# Patient Record
Sex: FEMALE | Race: WHITE | NOT HISPANIC OR LATINO | Employment: FULL TIME | URBAN - NONMETROPOLITAN AREA
[De-identification: names, ages, dates, MRNs, and addresses within clinical notes are randomized per-mention and may not be internally consistent; named-entity substitution may affect disease eponyms.]

---

## 2017-04-17 ENCOUNTER — HOSPITAL ENCOUNTER (EMERGENCY)
Facility: HOSPITAL | Age: 37
Discharge: HOME | End: 2017-04-17
Attending: EMERGENCY MEDICINE | Admitting: EMERGENCY MEDICINE

## 2017-04-17 ENCOUNTER — APPOINTMENT (OUTPATIENT)
Dept: CT IMAGING | Facility: HOSPITAL | Age: 37
End: 2017-04-17

## 2017-04-17 VITALS
HEIGHT: 65 IN | SYSTOLIC BLOOD PRESSURE: 116 MMHG | TEMPERATURE: 98.8 F | DIASTOLIC BLOOD PRESSURE: 72 MMHG | OXYGEN SATURATION: 100 % | HEART RATE: 66 BPM | BODY MASS INDEX: 28.99 KG/M2 | RESPIRATION RATE: 16 BRPM | WEIGHT: 174 LBS

## 2017-04-17 DIAGNOSIS — G43.009 MIGRAINE WITHOUT AURA AND WITHOUT STATUS MIGRAINOSUS, NOT INTRACTABLE: Primary | ICD-10-CM

## 2017-04-17 LAB
ALBUMIN SERPL-MCNC: 4.5 G/DL (ref 3.5–5)
ALBUMIN/GLOB SERPL: 1.5 G/DL (ref 1.5–2.5)
ALP SERPL-CCNC: 40 U/L (ref 35–104)
ALT SERPL W P-5'-P-CCNC: 13 U/L (ref 10–36)
AMPHET+METHAMPHET UR QL: NEGATIVE
ANION GAP SERPL CALCULATED.3IONS-SCNC: 5.7 MMOL/L (ref 3.6–11.2)
AST SERPL-CCNC: 19 U/L (ref 10–30)
B-HCG UR QL: NEGATIVE
BARBITURATES UR QL SCN: NEGATIVE
BASOPHILS # BLD AUTO: 0.07 10*3/MM3 (ref 0–0.3)
BASOPHILS NFR BLD AUTO: 1.3 % (ref 0–2)
BENZODIAZ UR QL SCN: NEGATIVE
BILIRUB SERPL-MCNC: 0.4 MG/DL (ref 0.2–1.8)
BILIRUB UR QL STRIP: NEGATIVE
BUN BLD-MCNC: 11 MG/DL (ref 7–21)
BUN/CREAT SERPL: 16.4 (ref 7–25)
CALCIUM SPEC-SCNC: 9.2 MG/DL (ref 7.7–10)
CANNABINOIDS SERPL QL: NEGATIVE
CHLORIDE SERPL-SCNC: 106 MMOL/L (ref 99–112)
CLARITY UR: CLEAR
CO2 SERPL-SCNC: 27.3 MMOL/L (ref 24.3–31.9)
COCAINE UR QL: NEGATIVE
COLOR UR: YELLOW
CREAT BLD-MCNC: 0.67 MG/DL (ref 0.43–1.29)
DEPRECATED RDW RBC AUTO: 42.5 FL (ref 37–54)
EOSINOPHIL # BLD AUTO: 0.06 10*3/MM3 (ref 0–0.7)
EOSINOPHIL NFR BLD AUTO: 1.1 % (ref 0–5)
ERYTHROCYTE [DISTWIDTH] IN BLOOD BY AUTOMATED COUNT: 13.3 % (ref 11.5–14.5)
GFR SERPL CREATININE-BSD FRML MDRD: 100 ML/MIN/1.73
GLOBULIN UR ELPH-MCNC: 3.1 GM/DL
GLUCOSE BLD-MCNC: 96 MG/DL (ref 70–110)
GLUCOSE UR STRIP-MCNC: NEGATIVE MG/DL
HCT VFR BLD AUTO: 36.5 % (ref 37–47)
HGB BLD-MCNC: 12.1 G/DL (ref 12–16)
HGB UR QL STRIP.AUTO: NEGATIVE
IMM GRANULOCYTES # BLD: 0 10*3/MM3 (ref 0–0.03)
IMM GRANULOCYTES NFR BLD: 0 % (ref 0–0.5)
KETONES UR QL STRIP: NEGATIVE
LEUKOCYTE ESTERASE UR QL STRIP.AUTO: NEGATIVE
LYMPHOCYTES # BLD AUTO: 1.45 10*3/MM3 (ref 1–3)
LYMPHOCYTES NFR BLD AUTO: 27.6 % (ref 21–51)
MCH RBC QN AUTO: 29 PG (ref 27–33)
MCHC RBC AUTO-ENTMCNC: 33.2 G/DL (ref 33–37)
MCV RBC AUTO: 87.5 FL (ref 80–94)
METHADONE UR QL SCN: NEGATIVE
MONOCYTES # BLD AUTO: 0.6 10*3/MM3 (ref 0.1–0.9)
MONOCYTES NFR BLD AUTO: 11.4 % (ref 0–10)
NEUTROPHILS # BLD AUTO: 3.08 10*3/MM3 (ref 1.4–6.5)
NEUTROPHILS NFR BLD AUTO: 58.6 % (ref 30–70)
NITRITE UR QL STRIP: NEGATIVE
OPIATES UR QL: NEGATIVE
OSMOLALITY SERPL CALC.SUM OF ELEC: 276.8 MOSM/KG (ref 273–305)
OXYCODONE UR QL SCN: NEGATIVE
PCP UR QL SCN: NEGATIVE
PH UR STRIP.AUTO: 7.5 [PH] (ref 5–8)
PLATELET # BLD AUTO: 158 10*3/MM3 (ref 130–400)
PMV BLD AUTO: 11.2 FL (ref 6–10)
POTASSIUM BLD-SCNC: 4 MMOL/L (ref 3.5–5.3)
PROPOXYPH UR QL: NEGATIVE
PROT SERPL-MCNC: 7.6 G/DL (ref 6–8)
PROT UR QL STRIP: NEGATIVE
RBC # BLD AUTO: 4.17 10*6/MM3 (ref 4.2–5.4)
SODIUM BLD-SCNC: 139 MMOL/L (ref 135–153)
SP GR UR STRIP: 1.01 (ref 1–1.03)
UROBILINOGEN UR QL STRIP: NORMAL
WBC NRBC COR # BLD: 5.26 10*3/MM3 (ref 4.5–12.5)

## 2017-04-17 PROCEDURE — 25010000002 PROCHLORPERAZINE EDISYLATE PER 10 MG: Performed by: EMERGENCY MEDICINE

## 2017-04-17 PROCEDURE — 96375 TX/PRO/DX INJ NEW DRUG ADDON: CPT

## 2017-04-17 PROCEDURE — 80307 DRUG TEST PRSMV CHEM ANLYZR: CPT | Performed by: PHYSICIAN ASSISTANT

## 2017-04-17 PROCEDURE — 96361 HYDRATE IV INFUSION ADD-ON: CPT

## 2017-04-17 PROCEDURE — 36415 COLL VENOUS BLD VENIPUNCTURE: CPT

## 2017-04-17 PROCEDURE — 25010000002 DEXAMETHASONE PER 1 MG: Performed by: EMERGENCY MEDICINE

## 2017-04-17 PROCEDURE — 81025 URINE PREGNANCY TEST: CPT | Performed by: PHYSICIAN ASSISTANT

## 2017-04-17 PROCEDURE — 70450 CT HEAD/BRAIN W/O DYE: CPT

## 2017-04-17 PROCEDURE — 81003 URINALYSIS AUTO W/O SCOPE: CPT | Performed by: PHYSICIAN ASSISTANT

## 2017-04-17 PROCEDURE — 25010000002 KETOROLAC TROMETHAMINE PER 15 MG: Performed by: EMERGENCY MEDICINE

## 2017-04-17 PROCEDURE — 99283 EMERGENCY DEPT VISIT LOW MDM: CPT

## 2017-04-17 PROCEDURE — 80053 COMPREHEN METABOLIC PANEL: CPT | Performed by: PHYSICIAN ASSISTANT

## 2017-04-17 PROCEDURE — 96374 THER/PROPH/DIAG INJ IV PUSH: CPT

## 2017-04-17 PROCEDURE — 70450 CT HEAD/BRAIN W/O DYE: CPT | Performed by: RADIOLOGY

## 2017-04-17 PROCEDURE — 25010000002 DIPHENHYDRAMINE PER 50 MG: Performed by: EMERGENCY MEDICINE

## 2017-04-17 PROCEDURE — 85025 COMPLETE CBC W/AUTO DIFF WBC: CPT | Performed by: PHYSICIAN ASSISTANT

## 2017-04-17 RX ORDER — LEVOTHYROXINE SODIUM 112 UG/1
100 CAPSULE ORAL DAILY
COMMUNITY

## 2017-04-17 RX ORDER — CETIRIZINE HYDROCHLORIDE 10 MG/1
10 TABLET ORAL AS NEEDED
COMMUNITY

## 2017-04-17 RX ORDER — KETOROLAC TROMETHAMINE 30 MG/ML
30 INJECTION, SOLUTION INTRAMUSCULAR; INTRAVENOUS ONCE
Status: COMPLETED | OUTPATIENT
Start: 2017-04-17 | End: 2017-04-17

## 2017-04-17 RX ORDER — OMEPRAZOLE 20 MG/1
20 CAPSULE, DELAYED RELEASE ORAL AS NEEDED
COMMUNITY

## 2017-04-17 RX ORDER — MORPHINE SULFATE 2 MG/ML
2 INJECTION, SOLUTION INTRAMUSCULAR; INTRAVENOUS ONCE
Status: DISCONTINUED | OUTPATIENT
Start: 2017-04-17 | End: 2017-04-17

## 2017-04-17 RX ORDER — MONTELUKAST SODIUM 10 MG/1
10 TABLET ORAL AS NEEDED
COMMUNITY

## 2017-04-17 RX ORDER — SODIUM CHLORIDE 0.9 % (FLUSH) 0.9 %
10 SYRINGE (ML) INJECTION AS NEEDED
Status: DISCONTINUED | OUTPATIENT
Start: 2017-04-17 | End: 2017-04-17 | Stop reason: HOSPADM

## 2017-04-17 RX ORDER — DIPHENHYDRAMINE HYDROCHLORIDE 50 MG/ML
25 INJECTION INTRAMUSCULAR; INTRAVENOUS ONCE
Status: COMPLETED | OUTPATIENT
Start: 2017-04-17 | End: 2017-04-17

## 2017-04-17 RX ADMIN — SODIUM CHLORIDE 1000 ML: 9 INJECTION, SOLUTION INTRAVENOUS at 10:39

## 2017-04-17 RX ADMIN — DIPHENHYDRAMINE HYDROCHLORIDE 25 MG: 50 INJECTION INTRAMUSCULAR; INTRAVENOUS at 12:04

## 2017-04-17 RX ADMIN — DEXAMETHASONE SODIUM PHOSPHATE 10 MG: 4 INJECTION, SOLUTION INTRAMUSCULAR; INTRAVENOUS at 12:14

## 2017-04-17 RX ADMIN — KETOROLAC TROMETHAMINE 30 MG: 30 INJECTION, SOLUTION INTRAMUSCULAR; INTRAVENOUS at 12:07

## 2017-04-17 RX ADMIN — PROCHLORPERAZINE EDISYLATE 10 MG: 5 INJECTION INTRAMUSCULAR; INTRAVENOUS at 12:10

## 2017-04-17 NOTE — ED PROVIDER NOTES
Subjective   Patient is a 37 y.o. female presenting with headaches.   History provided by:  Patient   used: No    Headache   Pain location:  Generalized  Quality:  Stabbing  Radiates to:  Does not radiate  Severity at highest:  8/10  Onset quality:  Sudden  Duration:  2 days  Timing:  Intermittent  Progression:  Worsening  Chronicity:  Recurrent  Similar to prior headaches: no    Context: bright light, loud noise and straining    Context: not caffeine and not eating    Relieved by:  Nothing  Worsened by:  Nothing  Associated symptoms: no abdominal pain, no back pain, no blurred vision, no cough, no dizziness, no drainage, no facial pain, no fatigue, no fever, no focal weakness, no neck pain, no neck stiffness, no numbness, no paresthesias, no photophobia, no sore throat, no syncope and no vomiting    Risk factors: no anger and no family hx of SAH        Review of Systems   Constitutional: Negative for fatigue and fever.   HENT: Negative for postnasal drip and sore throat.    Eyes: Negative for blurred vision and photophobia.   Respiratory: Negative for cough.    Cardiovascular: Negative for syncope.   Gastrointestinal: Negative for abdominal pain and vomiting.   Musculoskeletal: Negative for back pain, neck pain and neck stiffness.   Neurological: Positive for headaches. Negative for dizziness, focal weakness, numbness and paresthesias.   All other systems reviewed and are negative.      Past Medical History:   Diagnosis Date   • Asthma    • Cutaneous lupus erythematosus    • Disease of thyroid gland    • Migraine        No Known Allergies    Past Surgical History:   Procedure Laterality Date   • APPENDECTOMY     • HERNIA REPAIR     • TUBAL ABDOMINAL LIGATION         History reviewed. No pertinent family history.    Social History     Social History   • Marital status:      Spouse name: N/A   • Number of children: N/A   • Years of education: N/A     Social History Main Topics   • Smoking  status: Former Smoker     Quit date: 4/17/2014   • Smokeless tobacco: None   • Alcohol use No   • Drug use: No   • Sexual activity: Not Asked     Other Topics Concern   • None     Social History Narrative   • None           Objective   Physical Exam   Constitutional: She is oriented to person, place, and time. She appears well-developed and well-nourished.   HENT:   Head: Normocephalic.   Right Ear: External ear normal.   Left Ear: External ear normal.   Nose: Nose normal.   Mouth/Throat: Oropharynx is clear and moist.   Eyes: Conjunctivae and EOM are normal. Pupils are equal, round, and reactive to light.   Neck: Normal range of motion. Neck supple. No tracheal deviation present. No thyromegaly present.   Cardiovascular: Normal rate, regular rhythm, normal heart sounds and intact distal pulses.    Pulmonary/Chest: Effort normal and breath sounds normal.   Abdominal: Soft. Bowel sounds are normal.   Musculoskeletal: Normal range of motion.   Neurological: She is alert and oriented to person, place, and time. She has normal reflexes.   Skin: Skin is warm and dry.   Psychiatric: She has a normal mood and affect. Her behavior is normal. Judgment and thought content normal.   Nursing note and vitals reviewed.      Procedures         ED Course  ED Course   Comment By Time   37-year-old female comes in with chief complaint migraine headache ×2 days.  Patient does state she's had some vision changes which is common with her migraines.  Patient also reports she's had tongue swelling and difficulty speaking which is atypical for her migraines.  Patient denies any previous history of strokes, hypertension, diabetes, heart disease.  Patient does describe the pain as globally throbbing headache.  Does state the light and sound tend to exacerbate. Maikel Loya PA-C 04/17 1038                  [facility]  Number of Diagnoses or Management Options  Migraine without aura and without status migrainosus, not intractable: new and  requires workup     Amount and/or Complexity of Data Reviewed  Clinical lab tests: ordered and reviewed  Tests in the radiology section of CPT®: ordered and reviewed  Independent visualization of images, tracings, or specimens: yes    Risk of Complications, Morbidity, and/or Mortality  Presenting problems: moderate  Diagnostic procedures: moderate  Management options: moderate    Patient Progress  Patient progress: stable      Final diagnoses:   Migraine without aura and without status migrainosus, not intractable            Maikel Loya PA-C  04/17/17 [id number]

## 2017-06-15 ENCOUNTER — OFFICE VISIT (OUTPATIENT)
Dept: NEUROLOGY | Facility: CLINIC | Age: 37
End: 2017-06-15

## 2017-06-15 VITALS
SYSTOLIC BLOOD PRESSURE: 115 MMHG | OXYGEN SATURATION: 99 % | HEART RATE: 79 BPM | WEIGHT: 160 LBS | HEIGHT: 65 IN | BODY MASS INDEX: 26.66 KG/M2 | DIASTOLIC BLOOD PRESSURE: 72 MMHG

## 2017-06-15 DIAGNOSIS — G43.109 MIGRAINE WITH AURA AND WITHOUT STATUS MIGRAINOSUS, NOT INTRACTABLE: Primary | ICD-10-CM

## 2017-06-15 PROCEDURE — 99244 OFF/OP CNSLTJ NEW/EST MOD 40: CPT | Performed by: PSYCHIATRY & NEUROLOGY

## 2017-06-15 RX ORDER — SUMATRIPTAN 100 MG/1
100 TABLET, FILM COATED ORAL ONCE AS NEEDED
Qty: 9 TABLET | Refills: 5 | Status: SHIPPED | OUTPATIENT
Start: 2017-06-15 | End: 2017-09-21 | Stop reason: SDUPTHER

## 2017-06-15 RX ORDER — TOPIRAMATE 25 MG/1
TABLET ORAL
Refills: 2 | COMMUNITY
Start: 2017-06-01 | End: 2017-06-15 | Stop reason: SDUPTHER

## 2017-06-15 RX ORDER — SUMATRIPTAN 20 MG/1
1 SPRAY NASAL
Qty: 6 EACH | Refills: 6 | Status: SHIPPED | OUTPATIENT
Start: 2017-06-15 | End: 2017-09-21 | Stop reason: SDUPTHER

## 2017-06-15 RX ORDER — TOPIRAMATE 100 MG/1
100 TABLET, FILM COATED ORAL DAILY
Qty: 30 TABLET | Refills: 5 | Status: SHIPPED | OUTPATIENT
Start: 2017-06-15 | End: 2017-09-21 | Stop reason: SINTOL

## 2017-06-15 NOTE — PROGRESS NOTES
Subjective:     Patient ID: Estephanie Nowak is a 37 y.o. female.    History of Present Illness  The following portions of the patient's history were reviewed and updated as appropriate: allergies, current medications, past family history, past medical history, past social history, past surgical history and problem list.  38 y/o WF with history of migraine type headaches for over 5 years, usually right frontal, throbbing, a/w nausea, photophobia, blurred vision, has had an episode of slurred speech and right sided facial and mouth numbness, facial muscles drawing up in the setting of her typical migraine in April, was seen in local ER where her head CT was reportedly negative and symptoms resolved with a migraine cocktail. She denies similar aura symptoms in the past. She has found low dose sumatriptan somewhat helpful, still having headaches several times a week, more around menses, feels that Topamax may be helping some. Denies head or neck trauma, anxiety, depression, family history of aneurism.  Review of Systems   Constitutional: Positive for fatigue. Negative for chills, fever and unexpected weight change.   HENT: Positive for sore throat and voice change. Negative for ear pain, hearing loss, nosebleeds and rhinorrhea.    Eyes: Positive for pain. Negative for photophobia, discharge, itching and visual disturbance.   Respiratory: Positive for cough and wheezing. Negative for chest tightness and shortness of breath.    Cardiovascular: Negative for chest pain, palpitations and leg swelling.   Gastrointestinal: Positive for constipation and diarrhea. Negative for abdominal pain, blood in stool, nausea and vomiting.   Genitourinary: Positive for difficulty urinating. Negative for dysuria, frequency, hematuria and urgency.   Musculoskeletal: Positive for joint swelling. Negative for arthralgias, back pain, gait problem, myalgias, neck pain and neck stiffness.   Skin: Negative for rash and wound.    Allergic/Immunologic: Negative for environmental allergies and food allergies.   Neurological: Negative for dizziness, tremors, seizures, syncope, speech difficulty, weakness, light-headedness, numbness and headaches.   Hematological: Negative for adenopathy. Does not bruise/bleed easily.   Psychiatric/Behavioral: Negative for agitation, confusion, decreased concentration, hallucinations, sleep disturbance and suicidal ideas. The patient is not nervous/anxious.         Objective:    Neurologic Exam     Mental Status   Oriented to person, place, and time.     Cranial Nerves     CN III, IV, VI   Pupils are equal, round, and reactive to light.  Extraocular motions are normal.     Motor Exam     Strength   Strength 5/5 throughout.       Physical Exam   Constitutional: She is oriented to person, place, and time. She appears well-developed and well-nourished.   HENT:   Head: Normocephalic and atraumatic.   Eyes: EOM are normal. Pupils are equal, round, and reactive to light.   Neck: Normal range of motion. Neck supple. Carotid bruit is not present.   Cardiovascular: Normal rate, regular rhythm, normal heart sounds and intact distal pulses.    Pulmonary/Chest: Effort normal and breath sounds normal.   Abdominal: Soft. Bowel sounds are normal.   Musculoskeletal: Normal range of motion.   Neurological: She is alert and oriented to person, place, and time. She has normal strength and normal reflexes. No cranial nerve deficit or sensory deficit. She displays a negative Romberg sign. Coordination and gait normal. She displays no Babinski's sign on the right side. She displays no Babinski's sign on the left side.   Skin: Skin is warm.   Psychiatric: She has a normal mood and affect. Her behavior is normal. Thought content normal. Cognition and memory are normal.       Assessment/Plan:     Estephanie was seen today for migraine.    Diagnoses and all orders for this visit:    Migraine with aura and without status migrainosus, not  intractable  -     MRI Brain Without Contrast  -     topiramate (TOPAMAX) 100 MG tablet; Take 1 tablet by mouth Daily.  -     SUMAtriptan (IMITREX) 100 MG tablet; Take 1 tablet by mouth 1 (One) Time As Needed for Migraine.  -     SUMAtriptan (IMITREX) 20 MG/ACT nasal spray; 1 spray into each nostril Every 2 (Two) Hours As Needed for Migraine.  -     MRI Angiogram Head Without Contrast       MRI of brain is requested upon consideration of a migraine related stroke, MRA upon consideration of cerebral aneurism.

## 2017-07-03 ENCOUNTER — TELEPHONE (OUTPATIENT)
Dept: NEUROLOGY | Facility: CLINIC | Age: 37
End: 2017-07-03

## 2017-07-03 NOTE — TELEPHONE ENCOUNTER
PT STATED THAT SHE IS NOW HAVING BACK PAIN, NECK SHOULDER AND NECK PAIN. SHE WANTED TO KNOW IF SHE COULD HAVE AN MRI OF L SPINE WITH HER MRI OF BRAIN. PLEASE ADVISE.

## 2017-07-05 ENCOUNTER — HOSPITAL ENCOUNTER (OUTPATIENT)
Dept: MRI IMAGING | Facility: HOSPITAL | Age: 37
Discharge: HOME | End: 2017-07-05
Admitting: PSYCHIATRY & NEUROLOGY

## 2017-07-05 PROCEDURE — 70551 MRI BRAIN STEM W/O DYE: CPT

## 2017-07-05 PROCEDURE — 70551 MRI BRAIN STEM W/O DYE: CPT | Performed by: RADIOLOGY

## 2017-07-06 ENCOUNTER — TELEPHONE (OUTPATIENT)
Dept: NEUROLOGY | Facility: CLINIC | Age: 37
End: 2017-07-06

## 2017-07-06 NOTE — TELEPHONE ENCOUNTER
Please notify patient MRI Brain shows no lesions. It does show some mild crowding in the back of the brain which is a normal variation, but can be associated with headaches and migraines. She can follow up with Dr. RON as scheduled for further discussion. thanks

## 2017-07-10 NOTE — TELEPHONE ENCOUNTER
Called patient, spoke with the patient, patient is aware of the results. Verbalized understanding.

## 2017-09-21 ENCOUNTER — OFFICE VISIT (OUTPATIENT)
Dept: NEUROLOGY | Facility: CLINIC | Age: 37
End: 2017-09-21

## 2017-09-21 VITALS
SYSTOLIC BLOOD PRESSURE: 106 MMHG | BODY MASS INDEX: 29.02 KG/M2 | HEART RATE: 92 BPM | DIASTOLIC BLOOD PRESSURE: 80 MMHG | HEIGHT: 64 IN | WEIGHT: 170 LBS | OXYGEN SATURATION: 99 %

## 2017-09-21 DIAGNOSIS — M54.40 CHRONIC LOW BACK PAIN WITH SCIATICA, SCIATICA LATERALITY UNSPECIFIED, UNSPECIFIED BACK PAIN LATERALITY: ICD-10-CM

## 2017-09-21 DIAGNOSIS — M79.18 MYOFASCIAL PAIN: ICD-10-CM

## 2017-09-21 DIAGNOSIS — G43.009 MIGRAINE WITHOUT AURA AND WITHOUT STATUS MIGRAINOSUS, NOT INTRACTABLE: Primary | ICD-10-CM

## 2017-09-21 DIAGNOSIS — G89.29 CHRONIC LOW BACK PAIN WITH SCIATICA, SCIATICA LATERALITY UNSPECIFIED, UNSPECIFIED BACK PAIN LATERALITY: ICD-10-CM

## 2017-09-21 DIAGNOSIS — G43.109 MIGRAINE WITH AURA AND WITHOUT STATUS MIGRAINOSUS, NOT INTRACTABLE: ICD-10-CM

## 2017-09-21 PROCEDURE — 99214 OFFICE O/P EST MOD 30 MIN: CPT | Performed by: PSYCHIATRY & NEUROLOGY

## 2017-09-21 RX ORDER — SUMATRIPTAN 20 MG/1
1 SPRAY NASAL
Qty: 6 EACH | Refills: 6 | Status: SHIPPED | OUTPATIENT
Start: 2017-09-21

## 2017-09-21 RX ORDER — CYCLOBENZAPRINE HCL 5 MG
5 TABLET ORAL DAILY PRN
Qty: 30 TABLET | Refills: 2 | Status: SHIPPED | OUTPATIENT
Start: 2017-09-21 | End: 2018-09-21

## 2017-09-21 RX ORDER — SUMATRIPTAN 100 MG/1
100 TABLET, FILM COATED ORAL ONCE AS NEEDED
Qty: 9 TABLET | Refills: 5 | Status: SHIPPED | OUTPATIENT
Start: 2017-09-21

## 2017-09-21 RX ORDER — RIBOFLAVIN (VITAMIN B2) 400 MG
400 TABLET ORAL DAILY
Qty: 30 TABLET | Refills: 11 | Status: SHIPPED | OUTPATIENT
Start: 2017-09-21 | End: 2019-03-11

## 2017-09-21 RX ORDER — UBIDECARENONE 200 MG
200 CAPSULE ORAL DAILY
Qty: 30 CAPSULE | Refills: 11 | Status: SHIPPED | OUTPATIENT
Start: 2017-09-21

## 2017-09-21 RX ORDER — NAPROXEN 500 MG/1
500 TABLET ORAL 2 TIMES DAILY PRN
Qty: 50 TABLET | Refills: 2 | Status: SHIPPED | OUTPATIENT
Start: 2017-09-21 | End: 2018-09-21

## 2017-09-21 RX ORDER — THIAMINE HCL 100 MG
500 TABLET ORAL DAILY
Qty: 30 TABLET | Refills: 11 | Status: SHIPPED | OUTPATIENT
Start: 2017-09-21 | End: 2019-03-12

## 2017-09-21 NOTE — PROGRESS NOTES
Subjective:     Patient ID: Estephanie Nowak is a 37 y.o. female.    HPI:   History of Present Illness  The following portions of the patient's history were reviewed and updated as appropriate: allergies, current medications, past family history, past medical history, past social history, past surgical history and problem list.     Patient has stopped Topamax because of hair loss, reports increasing frequency of headaches every other day to weekly, some lasting for days, some response to Imitrex. MRI of brain was normal, personally reviewed. Complains of chronic low back pain, some radiation to both anterior thighs, worse with activity, muscle spams, no lasting response to chiropractic adjustments for 9 months. Denies leg numbness, weakness, incontinence.      Past Medical History:   Diagnosis Date   • Asthma    • Cutaneous lupus erythematosus    • Disease of thyroid gland    • Migraine        Past Surgical History:   Procedure Laterality Date   • APPENDECTOMY     • HERNIA REPAIR     • TUBAL ABDOMINAL LIGATION         Social History     Social History   • Marital status:      Spouse name: N/A   • Number of children: N/A   • Years of education: N/A     Occupational History   • Not on file.     Social History Main Topics   • Smoking status: Former Smoker     Quit date: 4/17/2014   • Smokeless tobacco: Not on file   • Alcohol use No   • Drug use: No   • Sexual activity: Defer     Other Topics Concern   • Not on file     Social History Narrative       Family History   Problem Relation Age of Onset   • Cancer Maternal Grandmother    • Diabetes Maternal Grandmother    • Hypertension Maternal Grandmother         Review of Systems   Constitutional: Positive for fatigue. Negative for chills, fever and unexpected weight change.   HENT: Negative for ear pain, hearing loss, nosebleeds, rhinorrhea and sore throat.    Eyes: Negative.  Negative for photophobia, pain, discharge, itching and visual disturbance.    Respiratory: Negative.  Negative for cough, chest tightness, shortness of breath and wheezing.    Cardiovascular: Negative.  Negative for chest pain, palpitations and leg swelling.   Gastrointestinal: Negative.  Negative for abdominal pain, blood in stool, constipation, diarrhea, nausea and vomiting.   Endocrine: Negative.    Genitourinary: Negative.  Negative for dysuria, frequency, hematuria and urgency.   Musculoskeletal: Negative.  Negative for arthralgias, back pain, gait problem, joint swelling, myalgias, neck pain and neck stiffness.   Skin: Negative.  Negative for rash and wound.        itching   Allergic/Immunologic: Negative.  Negative for environmental allergies and food allergies.   Neurological: Positive for headaches. Negative for dizziness, tremors, seizures, syncope, speech difficulty, weakness, light-headedness and numbness.   Hematological: Negative.  Negative for adenopathy. Does not bruise/bleed easily.   Psychiatric/Behavioral: Positive for decreased concentration. Negative for agitation, confusion, hallucinations, sleep disturbance and suicidal ideas. The patient is not nervous/anxious.         Objective:    Neurologic Exam     Mental Status   Oriented to person, place, and time.     Cranial Nerves     CN III, IV, VI   Pupils are equal, round, and reactive to light.  Extraocular motions are normal.     Motor Exam     Strength   Strength 5/5 throughout.       Physical Exam   Constitutional: She is oriented to person, place, and time. She appears well-developed and well-nourished.   HENT:   Head: Normocephalic and atraumatic.   Eyes: EOM are normal. Pupils are equal, round, and reactive to light.   Neck: Normal range of motion. Neck supple. Carotid bruit is not present.   Cardiovascular: Normal rate, regular rhythm, normal heart sounds and intact distal pulses.    Pulmonary/Chest: Effort normal and breath sounds normal.   Abdominal: Soft. Bowel sounds are normal.   Musculoskeletal: Normal range  of motion.   Neurological: She is alert and oriented to person, place, and time. She has normal strength and normal reflexes. No cranial nerve deficit or sensory deficit. She displays a negative Romberg sign. Coordination and gait normal. She displays no Babinski's sign on the right side. She displays no Babinski's sign on the left side.   Skin: Skin is warm.   Psychiatric: She has a normal mood and affect. Her behavior is normal. Thought content normal. Cognition and memory are normal.       Assessment/Plan:       Estephanie was seen today for back pain and migraine.    Diagnoses and all orders for this visit:    Migraine without aura and without status migrainosus, not intractable  -     SUMAtriptan (IMITREX) 20 MG/ACT nasal spray; 1 spray into each nostril Every 2 (Two) Hours As Needed for Migraine.  -     SUMAtriptan (IMITREX) 100 MG tablet; Take 1 tablet by mouth 1 (One) Time As Needed for Migraine.  -     Coenzyme Q10 (CO Q10) 200 MG capsule; Take 200 mg by mouth Daily.  -     Magnesium 500 MG tablet; Take 500 mg by mouth Daily.  -     Riboflavin 400 MG tablet; Take 400 mg by mouth Daily.    Migraine with aura and without status migrainosus, not intractable    Myofascial pain  -     naproxen (NAPROSYN) 500 MG tablet; Take 1 tablet by mouth 2 (Two) Times a Day As Needed for Mild Pain  or Moderate Pain .  -     cyclobenzaprine (FLEXERIL) 5 MG tablet; Take 1 tablet by mouth Daily As Needed for Muscle Spasms.    Chronic low back pain with sciatica, sciatica laterality unspecified, unspecified back pain laterality  -     MRI Lumbar Spine Without Contrast  -     naproxen (NAPROSYN) 500 MG tablet; Take 1 tablet by mouth 2 (Two) Times a Day As Needed for Mild Pain  or Moderate Pain .  -     cyclobenzaprine (FLEXERIL) 5 MG tablet; Take 1 tablet by mouth Daily As Needed for Muscle Spasms.       MRI of ls spine is requested upon consideration of spinal mass, stenosis, failure of therapy.    Femi Navarro,  MD  9/21/2017

## 2017-10-03 ENCOUNTER — HOSPITAL ENCOUNTER (OUTPATIENT)
Dept: MRI IMAGING | Facility: HOSPITAL | Age: 37
Discharge: HOME | End: 2017-10-03
Attending: PSYCHIATRY & NEUROLOGY | Admitting: PSYCHIATRY & NEUROLOGY

## 2017-10-03 PROCEDURE — 72148 MRI LUMBAR SPINE W/O DYE: CPT

## 2017-10-04 ENCOUNTER — TELEPHONE (OUTPATIENT)
Dept: NEUROLOGY | Facility: CLINIC | Age: 37
End: 2017-10-04

## 2017-10-04 NOTE — TELEPHONE ENCOUNTER
MRI Lumbar spine shows a mild bulging disc at L4-L5 with no narrowing of the spinal canal and otherwise is WNL. Recommend conservative management with either physical therapy or chiropractor care. If she would like to try PT then we can mail her an order. Thanks.

## 2017-10-10 ENCOUNTER — OFFICE VISIT (OUTPATIENT)
Dept: RETAIL CLINIC | Facility: CLINIC | Age: 37
End: 2017-10-10

## 2017-10-10 DIAGNOSIS — Z02.1 DRUG SCREENING, PRE-EMPLOYMENT: Primary | ICD-10-CM

## 2017-10-10 NOTE — PROGRESS NOTES
Subjective   Estephanie Nowak is a 37 y.o. female.     Reason for Appointment  1. escreen    History of Present Illness  HPI:   See scanned document. See custody control form.    Assessments  1. Encounter for screening, unspecified - Z13.9    This document has been electronically signed by MICHELLE Tse October 10, 2017 9:39 AM

## 2017-11-21 ENCOUNTER — TRANSCRIBE ORDERS (OUTPATIENT)
Dept: ADMINISTRATIVE | Facility: HOSPITAL | Age: 37
End: 2017-11-21

## 2017-11-21 ENCOUNTER — LAB (OUTPATIENT)
Dept: LAB | Facility: HOSPITAL | Age: 37
End: 2017-11-21
Attending: INTERNAL MEDICINE

## 2017-11-21 DIAGNOSIS — M45.9 RHEUMATOID ARTHRITIS INVOLVING VERTEBRA WITH POSITIVE RHEUMATOID FACTOR (HCC): ICD-10-CM

## 2017-11-21 DIAGNOSIS — R76.0 RAISED ANTIBODY TITER: ICD-10-CM

## 2017-11-21 DIAGNOSIS — R76.0 RAISED ANTIBODY TITER: Primary | ICD-10-CM

## 2017-11-21 LAB
CK SERPL-CCNC: 122 U/L (ref 24–173)
CRP SERPL-MCNC: <0.5 MG/DL (ref 0–0.99)
ERYTHROCYTE [SEDIMENTATION RATE] IN BLOOD: 7 MM/HR (ref 0–20)

## 2017-11-21 PROCEDURE — 86235 NUCLEAR ANTIGEN ANTIBODY: CPT

## 2017-11-21 PROCEDURE — 83516 IMMUNOASSAY NONANTIBODY: CPT

## 2017-11-21 PROCEDURE — 82550 ASSAY OF CK (CPK): CPT

## 2017-11-21 PROCEDURE — 36415 COLL VENOUS BLD VENIPUNCTURE: CPT

## 2017-11-21 PROCEDURE — 86800 THYROGLOBULIN ANTIBODY: CPT

## 2017-11-21 PROCEDURE — 86225 DNA ANTIBODY NATIVE: CPT

## 2017-11-21 PROCEDURE — 86140 C-REACTIVE PROTEIN: CPT

## 2017-11-21 PROCEDURE — 86376 MICROSOMAL ANTIBODY EACH: CPT

## 2017-11-21 PROCEDURE — 86200 CCP ANTIBODY: CPT

## 2017-11-21 PROCEDURE — 85652 RBC SED RATE AUTOMATED: CPT

## 2017-11-22 LAB
ACTIN IGG SERPL-ACNC: 13 UNITS (ref 0–19)
CCP IGA+IGG SERPL IA-ACNC: 8 UNITS (ref 0–19)
CENTROMERE B AB SER-ACNC: <0.2 AI (ref 0–0.9)
CHROMATIN AB SERPL-ACNC: <0.2 AI (ref 0–0.9)
DSDNA AB SER-ACNC: 1 IU/ML (ref 0–9)
ENA JO1 AB SER-ACNC: <0.2 AI (ref 0–0.9)
ENA RNP AB SER-ACNC: <0.2 AI (ref 0–0.9)
ENA SCL70 AB SER-ACNC: <0.2 AI (ref 0–0.9)
ENA SM AB SER-ACNC: <0.2 AI (ref 0–0.9)
ENA SS-A AB SER-ACNC: <0.2 AI (ref 0–0.9)
ENA SS-B AB SER-ACNC: <0.2 AI (ref 0–0.9)
Lab: NORMAL
THYROGLOB AB SERPL-ACNC: <1 IU/ML (ref 0–0.9)
THYROPEROXIDASE AB SERPL-ACNC: 122 IU/ML (ref 0–34)

## 2017-11-23 LAB — HISTONE IGG SER IA-ACNC: 0.4 UNITS (ref 0–0.9)

## 2019-03-11 ENCOUNTER — HOSPITAL ENCOUNTER (OUTPATIENT)
Dept: GENERAL RADIOLOGY | Facility: HOSPITAL | Age: 39
Discharge: HOME | End: 2019-03-11
Admitting: PHYSICIAN ASSISTANT

## 2019-03-11 ENCOUNTER — OFFICE VISIT (OUTPATIENT)
Dept: GASTROENTEROLOGY | Facility: CLINIC | Age: 39
End: 2019-03-11

## 2019-03-11 VITALS
SYSTOLIC BLOOD PRESSURE: 121 MMHG | WEIGHT: 170.6 LBS | HEIGHT: 64 IN | DIASTOLIC BLOOD PRESSURE: 73 MMHG | OXYGEN SATURATION: 98 % | BODY MASS INDEX: 29.12 KG/M2 | HEART RATE: 76 BPM

## 2019-03-11 DIAGNOSIS — R15.2 INCONTINENCE OF FECES WITH FECAL URGENCY: ICD-10-CM

## 2019-03-11 DIAGNOSIS — R15.9 INCONTINENCE OF FECES WITH FECAL URGENCY: ICD-10-CM

## 2019-03-11 DIAGNOSIS — E07.9 THYROID DISEASE: ICD-10-CM

## 2019-03-11 DIAGNOSIS — K90.0 CELIAC DISEASE: ICD-10-CM

## 2019-03-11 DIAGNOSIS — K90.0 CELIAC DISEASE: Primary | ICD-10-CM

## 2019-03-11 PROCEDURE — 74019 RADEX ABDOMEN 2 VIEWS: CPT | Performed by: RADIOLOGY

## 2019-03-11 PROCEDURE — 99243 OFF/OP CNSLTJ NEW/EST LOW 30: CPT | Performed by: PHYSICIAN ASSISTANT

## 2019-03-11 PROCEDURE — 74019 RADEX ABDOMEN 2 VIEWS: CPT

## 2019-03-11 NOTE — PROGRESS NOTES
": 1980    Chief Complaint   Patient presents with   • Abdominal Pain   • Diarrhea       Estephanie Nowak is a 39 y.o. female who presents to the office today as a consultation from Em Baca MD for evaluation of Abdominal Pain and Diarrhea.    History of Present Illness:  Reports progressively worsening diarrhea and severe fecal urgency with intermittent fecal incontinence over the past few months. She has noticed this occurring intermittently since Oct 2018. Also has flatulence. She does admit that she has celiac disease, diagnosed by Beatrice Thornton and confirmed with EGD by Dr. Perez in  per her report. She has been strictly gluten free for the past 4 years. When she consumes gluten on accident, she has bloating, abdominal pain and diarrhea. Pain is different now because pain is not as severe or bloating not as severe. BMs are described as occurring every \"day and a half.\" She will have times in which she feels constipated and other times that she has diarrhea. She will feel that she is going a lot and will only have a small amount of stool. Takes Prilosec 20 mg only as needed during her most severe symptoms, possibly 7 times per year. She had H pylori test in Dec 2018 which was negative and did stop Prilosec for 2 weeks. She had high fat content in her stool recently. Had negative occult testing. Negative O&P. Negative leukocyte. No other GI testing, blood testing has only showed thyroid abnormality which has been uncontrolled even with medication.     She had EGD and colonoscopy previously at age 16 in [place] and was told that she could have IBS but was never told more. Maternal aunt had colon cancer, another maternal aunt had rectal cancer. No first degree history of GI disease. No first degree family history of colon polyps.     Reports nausea which is occurring a few times per month without vomiting. Appetite is good. Weight is stable.     Review of Systems   Constitutional: Negative for " chills, fatigue and fever.   HENT: Negative for trouble swallowing.    Eyes: Negative.    Respiratory: Negative for cough, choking, chest tightness and shortness of breath.    Cardiovascular: Negative for chest pain.   Gastrointestinal: Positive for abdominal pain, anal bleeding, constipation, diarrhea, nausea and rectal pain. Negative for abdominal distention, blood in stool and vomiting.   Endocrine: Negative.    Genitourinary: Negative for difficulty urinating.   Musculoskeletal: Negative for back pain and neck pain.   Skin: Negative for color change, pallor, rash and wound.   Allergic/Immunologic: Negative for environmental allergies and food allergies.   Neurological: Positive for dizziness, light-headedness and headaches.   Hematological: Does not bruise/bleed easily.   Psychiatric/Behavioral: Negative.        Past Medical History:   Diagnosis Date   • Asthma    • Celiac disease    • Cutaneous lupus erythematosus    • Disease of thyroid gland    • Migraine        Past Surgical History:   Procedure Laterality Date   • APPENDECTOMY     • HERNIA REPAIR     • TUBAL ABDOMINAL LIGATION         Family History   Problem Relation Age of Onset   • Cancer Maternal Grandmother    • Diabetes Maternal Grandmother    • Hypertension Maternal Grandmother        Social History     Socioeconomic History   • Marital status:      Spouse name: Not on file   • Number of children: Not on file   • Years of education: Not on file   • Highest education level: Not on file   Tobacco Use   • Smoking status: Former Smoker     Last attempt to quit: 2014     Years since quittin.9   • Smokeless tobacco: Never Used   Substance and Sexual Activity   • Alcohol use: No   • Drug use: No   • Sexual activity: Defer       Current Outpatient Medications:   •  Albuterol (VENTOLIN IN), Inhale., Disp: , Rfl:   •  cetirizine (zyrTEC) 10 MG tablet, Take 10 mg by mouth Daily., Disp: , Rfl:   •  Coenzyme Q10 (CO Q10) 200 MG capsule, Take 200  "mg by mouth Daily., Disp: 30 capsule, Rfl: 11  •  Fluticasone Propionate, Inhal, (FLOVENT IN), Inhale., Disp: , Rfl:   •  levothyroxine (SYNTHROID, LEVOTHROID) 88 MCG tablet, Take 88 mcg by mouth Daily., Disp: , Rfl:   •  Magnesium 500 MG tablet, Take 500 mg by mouth Daily., Disp: 30 tablet, Rfl: 11  •  montelukast (SINGULAIR) 10 MG tablet, Take 10 mg by mouth Every Night., Disp: , Rfl:   •  omeprazole (priLOSEC) 20 MG capsule, Take 20 mg by mouth Daily., Disp: , Rfl:   •  SUMAtriptan (IMITREX) 100 MG tablet, Take 1 tablet by mouth 1 (One) Time As Needed for Migraine., Disp: 9 tablet, Rfl: 5  •  SUMAtriptan (IMITREX) 20 MG/ACT nasal spray, 1 spray into each nostril Every 2 (Two) Hours As Needed for Migraine., Disp: 6 each, Rfl: 6    Allergies:   Patient has no known allergies.    Vitals:  /73 (BP Location: Left arm, Patient Position: Sitting, Cuff Size: Adult)   Pulse 76   Ht 162.6 cm (64\")   Wt 77.4 kg (170 lb 9.6 oz)   SpO2 98%   BMI 29.28 kg/m²     Physical Exam   Constitutional: She is oriented to person, place, and time. She appears well-developed and well-nourished. No distress.   HENT:   Head: Normocephalic and atraumatic.   Nose: Nose normal.   Mouth/Throat: Oropharynx is clear and moist.   Eyes: Conjunctivae are normal. Right eye exhibits no discharge. Left eye exhibits no discharge. No scleral icterus.   Neck: Normal range of motion. No JVD present.   Cardiovascular: Normal rate, regular rhythm and normal heart sounds. Exam reveals no gallop and no friction rub.   No murmur heard.  Pulmonary/Chest: Effort normal and breath sounds normal. No respiratory distress. She has no wheezes. She has no rales. She exhibits no tenderness.   Abdominal: Soft. Bowel sounds are normal. She exhibits no mass. There is tenderness (generalized, mild).   Musculoskeletal: Normal range of motion. She exhibits no edema or deformity.   Neurological: She is alert and oriented to person, place, and time. Coordination " normal.   Skin: Skin is warm and dry. No rash noted. She is not diaphoretic. No erythema.   Psychiatric: She has a normal mood and affect. Her behavior is normal. Judgment and thought content normal.   Vitals reviewed.      Assessment/Plan:  1. Celiac disease    2. Incontinence of feces with fecal urgency    3. Thyroid disease      Orders Placed This Encounter   Procedures   • XR Abdomen Flat & Upright     With her current diet and complaints, I feel that she is likely constipated. Abdominal film has been ordered for evaluation. She will be called with those results and given further recommendations. She will continue with strict gluten free diet due to past diagnosis of celiac disease. She will follow with PCP regarding better control of her hypothyroidism which could definitely be contributing to her complaints.         Return in about 3 weeks (around 4/1/2019) for recheck abdominal pain.      Electronically signed 3/13/2019 6:46 PM  Alyse Aranda PA-C, [facility]

## 2019-03-12 ENCOUNTER — TELEPHONE (OUTPATIENT)
Dept: GASTROENTEROLOGY | Facility: CLINIC | Age: 39
End: 2019-03-12

## 2019-03-12 NOTE — TELEPHONE ENCOUNTER
Spoke with patient and let her know results and recommendations. I explained to her how to to the bowel cleanse. She voiced understanding.

## 2019-03-12 NOTE — TELEPHONE ENCOUNTER
Patient's abdominal film showed severe constipation. She will need to complete magnesium citrate bowel cleanse then start Trulance daily. Also- please tell her to stop magnesium supplement for 3 days at the time of her cleanse.

## 2019-04-01 ENCOUNTER — OFFICE VISIT (OUTPATIENT)
Dept: GASTROENTEROLOGY | Facility: CLINIC | Age: 39
End: 2019-04-01

## 2019-04-01 VITALS
OXYGEN SATURATION: 99 % | HEIGHT: 64 IN | HEART RATE: 80 BPM | SYSTOLIC BLOOD PRESSURE: 124 MMHG | DIASTOLIC BLOOD PRESSURE: 80 MMHG | WEIGHT: 170.4 LBS | BODY MASS INDEX: 29.09 KG/M2

## 2019-04-01 DIAGNOSIS — K59.04 CHRONIC IDIOPATHIC CONSTIPATION: Primary | ICD-10-CM

## 2019-04-01 DIAGNOSIS — K90.0 CELIAC DISEASE: ICD-10-CM

## 2019-04-01 PROCEDURE — 99214 OFFICE O/P EST MOD 30 MIN: CPT | Performed by: PHYSICIAN ASSISTANT

## 2019-04-01 NOTE — PATIENT INSTRUCTIONS
Fiber Foods  It is recommended that you consume 25-45 grams daily.  Note avoid the foods that contain gluten.   Goal of water intake is 3-4 bottles per day.     Fresh & Dried Fruit  Serving Size Fiber (g)    Apples with skin  1 medium 5.0    Apricot  3 medium 1.0    Apricots, dried  4 pieces 2.9    Banana  1 medium 3.9    Blueberries  1 cup 4.2    Cantaloupe, cubes  1 cup 1.3    Figs, dried  2 medium 3.7    Grapefruit  1/2 medium 3.1    Orange, navel  1 medium 3.4    Peach  1 medium 2.0    Peaches, dried  3 pieces 3.2    Pear  1 medium 5.1    Plum  1 medium 1.1    Raisins  1.5 oz box 1.6    Raspberries  1 cup 6.4    Strawberries  1 cup 4.4      Grains, Beans, Nuts & Seeds  Serving Size Fiber (g)    Almonds  1 oz 4.2    Black beans, cooked  1 cup 13.9    Bran cereal  1 cup 19.9    Bread, whole wheat  1 slice 2.0    Brown rice, dry  1 cup 7.9    Cashews  1 oz 1.0    Flax seeds  3 Tbsp. 6.9    Garbanzo beans, cooked  1 cup 5.8    Kidney beans, cooked  1 cup 11.6    Lentils, red cooked  1 cup 13.6    Lima beans, cooked  1 cup 8.6    Oats, rolled dry  1 cup 12.0    Quinoa (seeds) dry  1/4 cup 6.2    Quinoa, cooked  1 cup 8.4    Pasta, whole wheat  1 cup 6.3    Peanuts  1 oz 2.3    Pistachio nuts  1 oz 3.1    Pumpkin seeds  1/4 cup 4.1    Soybeans, cooked  1 cup 8.6    Sunflower seeds  1/4 cup 3.0    Walnuts  1 oz 3.1            Vegetables  Serving Size Fiber (g)    Avocado (fruit)  1 medium 11.8    Beets, cooked  1 cup 2.8    Beet greens  1 cup 4.2    Bok luis manuel, cooked  1 cup 2.8    Broccoli, cooked  1 cup 4.5    Brodhead sprouts, cooked  1 cup 3.6    Cabbage, cooked  1 cup 4.2    Carrot  1 medium 2.6    Carrot, cooked  1 cup 5.2    Cauliflower, cooked  1 cup 3.4    Jimenez slaw  1 cup 4.0    Bruno greens, cooked  1 cup 2.6    Corn, sweet  1 cup 4.6    Green beans  1 cup 4.0    Celery  1 stalk 1.1    Kale, cooked  1 cup 7.2    Onions, raw  1 cup 2.9    Peas, cooked  1 cup 8.8    Peppers, sweet  1 cup 2.6    Pop corn,  air-popped  3 cups 3.6    Potato, baked w/ skin  1 medium 4.8    Spinach, cooked  1 cup 4.3    Summer squash, cooked  1 cup 2.5    Sweet potato, cooked  1 medium 4.9    Swiss chard, cooked  1 cup 3.7    Tomato  1 medium 1.0    Winter squash, cooked  1 cup 6.2    Zucchini, cooked  1 cup 2.6

## 2019-04-01 NOTE — PROGRESS NOTES
: 1980    Chief Complaint   Patient presents with   • Abdominal Pain       Estephanie Nowak is a 39 y.o. female who presents to the office today as a follow up appointment regarding Abdominal Pain.    History of Present Illness:  She continues to adhere to a strict gluten-free diet due to previous diagnosis of celiac disease.  She states that after review of her abdominal film and receiving a call from our office, she completed magnesium citrate bowel cleanse about a week and a half ago as recommended.  She took both bottles of magnesium citrate in 1 day exactly as directed.  The following day, she started taking Trulance 3 mg once daily for treatment of constipation.  Even though she has been taking Trulance 3 mg once daily every day since then, she has not been having daily bowel movements as expected.  She is not having any more diarrhea.  Generally, her stools have been hard.  She has not had a bowel movement in the past 2 days.  She wonders if she needs longer for the medication to work.  She is fearful of having too much diarrhea while working because the bathroom is far from her seat. Bloating is still present intermittently. Nausea is intermittent as well without vomiting. She only takes Prilosec 20 mg as needed (rarely) for relief of abdominal pain.  She is drinking about 80 ounces of water daily.  She does not like taking oral medications and would like to have conservative therapy when possible.  She had a recent increase in her levothyroxine dosage for treatment of hypothyroidism and has follow-up appointment regarding this soon. She is not currently taking any probiotics.     Abdominal film 3/2019: moderate constipation    She had EGD and colonoscopy previously at age 16 in [place] and was told that she could have IBS but was never told more. Maternal aunt had colon cancer, another maternal aunt had rectal cancer. No first degree history of GI disease. No first degree family history of colon  polyps.      Review of Systems   Constitutional: Negative for chills, fatigue and fever.   HENT: Negative for trouble swallowing.    Eyes: Negative.    Respiratory: Negative for cough, choking, chest tightness and shortness of breath.    Cardiovascular: Negative for chest pain.   Gastrointestinal: Positive for abdominal distention, abdominal pain, constipation and rectal pain. Negative for anal bleeding, blood in stool, diarrhea, nausea and vomiting.   Endocrine: Negative.    Genitourinary: Negative for difficulty urinating.   Musculoskeletal: Negative for back pain and neck pain.   Skin: Negative for color change, pallor, rash and wound.   Allergic/Immunologic: Negative for environmental allergies and food allergies.   Neurological: Positive for dizziness, light-headedness and headaches.   Hematological: Does not bruise/bleed easily.   Psychiatric/Behavioral: Negative.        Past Medical History:   Diagnosis Date   • Asthma    • Celiac disease    • Cutaneous lupus erythematosus    • Disease of thyroid gland    • Migraine        Past Surgical History:   Procedure Laterality Date   • APPENDECTOMY     • HERNIA REPAIR     • TUBAL ABDOMINAL LIGATION         Family History   Problem Relation Age of Onset   • Cancer Maternal Grandmother    • Diabetes Maternal Grandmother    • Hypertension Maternal Grandmother        Social History     Socioeconomic History   • Marital status:      Spouse name: Not on file   • Number of children: Not on file   • Years of education: Not on file   • Highest education level: Not on file   Tobacco Use   • Smoking status: Former Smoker     Last attempt to quit: 2014     Years since quittin.9   • Smokeless tobacco: Never Used   Substance and Sexual Activity   • Alcohol use: No   • Drug use: No   • Sexual activity: Defer       Current Outpatient Medications:   •  Albuterol (VENTOLIN IN), Inhale., Disp: , Rfl:   •  cetirizine (zyrTEC) 10 MG tablet, Take 10 mg by mouth Daily.,  "Disp: , Rfl:   •  Coenzyme Q10 (CO Q10) 200 MG capsule, Take 200 mg by mouth Daily., Disp: 30 capsule, Rfl: 11  •  Fluticasone Propionate, Inhal, (FLOVENT IN), Inhale., Disp: , Rfl:   •  levothyroxine (SYNTHROID, LEVOTHROID) 100 mcg once daily  •  magnesium citrate solution, Take 296 mL by mouth Take As Directed. Take 1 bottle now than 2nd bottle approx 6 hours after for clean out., Disp: 592 mL, Rfl: 0  •  montelukast (SINGULAIR) 10 MG tablet, Take 10 mg by mouth Every Night., Disp: , Rfl:   •  omeprazole (priLOSEC) 20 MG capsule, Take 20 mg by mouth As Needed., Disp: , Rfl:   •  Plecanatide 3 MG tablet, Take 3 mg by mouth Daily., Disp: 30 tablet, Rfl: 5  •  SUMAtriptan (IMITREX) 100 MG tablet, Take 1 tablet by mouth 1 (One) Time As Needed for Migraine., Disp: 9 tablet, Rfl: 5  •  SUMAtriptan (IMITREX) 20 MG/ACT nasal spray, 1 spray into each nostril Every 2 (Two) Hours As Needed for Migraine., Disp: 6 each, Rfl: 6    Allergies:   Patient has no known allergies.    Vitals:  /80 (BP Location: Left arm, Patient Position: Sitting, Cuff Size: Adult)   Pulse 80   Ht 162.6 cm (64\")   Wt 77.3 kg (170 lb 6.4 oz)   SpO2 99%   BMI 29.25 kg/m²     Physical Exam   Constitutional: She is oriented to person, place, and time. She appears well-developed and well-nourished. No distress.   HENT:   Head: Normocephalic and atraumatic.   Nose: Nose normal.   Mouth/Throat: Oropharynx is clear and moist.   Eyes: Conjunctivae are normal. Right eye exhibits no discharge. Left eye exhibits no discharge. No scleral icterus.   Neck: Normal range of motion. No JVD present.   Cardiovascular: Normal rate, regular rhythm and normal heart sounds. Exam reveals no gallop and no friction rub.   No murmur heard.  Pulmonary/Chest: Effort normal and breath sounds normal. No respiratory distress. She has no wheezes. She has no rales. She exhibits no tenderness.   Abdominal: Soft. Bowel sounds are normal. She exhibits no mass. There is " tenderness (generalized, mild).   Musculoskeletal: Normal range of motion. She exhibits no edema or deformity.   Neurological: She is alert and oriented to person, place, and time. Coordination normal.   Skin: Skin is warm and dry. No rash noted. She is not diaphoretic. No erythema.   Psychiatric: She has a normal mood and affect. Her behavior is normal. Judgment and thought content normal.   Vitals reviewed.      Assessment/Plan:  1. Chronic idiopathic constipation    2. Celiac disease      Continue taking Trulance 3 mg once daily with or without food for treatment of constipation for now. If after a few days, it still does not seem to be helping then start Linzess 72 mcg once daily samples. She was also given samples of Linzess 145 mcg to increase to only if needed.     Trulance is a secretory stimulant (guanylate cyclase agonist) used to treat constipation by acting like the natural uroguanylin which helps to provide the appropriate amount of fluid in the intestines (works more in small intestine). Linzess is a secretory stimulant (guanylate cyclase agonist) used to treat constipation by binding to GC-C and acting locally on the luminal surface of the intestinal epithelium. Activation of GC-C results in an increase in intra/extracellular concentrations of cGMP which stimulates secretion of chloride and bicarbonate into the intestinal lumen. This results in increased intestinal fluid and accelerated transit (works more in large intestine).     She was instructed to increase dietary fiber intake to 25-45g daily and a list of fiber foods was given with gluten free diet in mind. She has agreed to try to increase daily water intake and daily exercise as well. Start daily probiotic and Align samples were given.    At next visit, if symptoms have not improved, she will consider endoscopy for further evaluation.          Return in about 2 months (around 6/1/2019) for recheck constipation.      Electronically signed  4/1/2019 1:44 PM  Alyse Aranda PA-C, [facility]

## 2019-06-03 ENCOUNTER — OFFICE VISIT (OUTPATIENT)
Dept: GASTROENTEROLOGY | Facility: CLINIC | Age: 39
End: 2019-06-03

## 2019-06-03 VITALS
OXYGEN SATURATION: 99 % | WEIGHT: 176.6 LBS | DIASTOLIC BLOOD PRESSURE: 77 MMHG | HEART RATE: 82 BPM | BODY MASS INDEX: 30.15 KG/M2 | HEIGHT: 64 IN | SYSTOLIC BLOOD PRESSURE: 115 MMHG

## 2019-06-03 DIAGNOSIS — K59.04 CHRONIC IDIOPATHIC CONSTIPATION: Primary | ICD-10-CM

## 2019-06-03 PROCEDURE — 99213 OFFICE O/P EST LOW 20 MIN: CPT | Performed by: PHYSICIAN ASSISTANT

## 2019-06-03 NOTE — PROGRESS NOTES
: 1980    Chief Complaint   Patient presents with   • Constipation       Estephanie Nowak is a 39 y.o. female who presents to the office today as a follow up appointment regarding Constipation.    History of Present Illness:  Patient reports that nothing has changed since her last visit approximately 2 months ago.  She is not currently taking any medications for chronic constipation.  She is still on a gluten-free diet due to lack disease history.  She has been busy with personal things over the past couple of months including graduations.  She states that she would like to continue with the current plan and call back to schedule another appointment to discuss further.  Nominal pain has improved some.  Constipation is still present with irregular bowel habits and never a daily stool.  She was previously taking Trulance 3 mg once daily and had been given Linzess samples to try.    Abdominal film 3/2019: moderate constipation     She had EGD and colonoscopy previously at age 16 in [place] and was told that she could have IBS but was never told more. Maternal aunt had colon cancer, another maternal aunt had rectal cancer. No first degree history of GI disease. No first degree family history of colon polyps.     Review of Systems   Constitutional: Negative for chills, fatigue and fever.   HENT: Negative for trouble swallowing.    Eyes: Negative.    Respiratory: Negative for cough, choking, chest tightness and shortness of breath.    Cardiovascular: Negative for chest pain.   Gastrointestinal: Positive for abdominal distention, abdominal pain, constipation, diarrhea and rectal pain. Negative for anal bleeding, blood in stool, nausea and vomiting.   Endocrine: Negative.    Genitourinary: Negative for difficulty urinating.   Musculoskeletal: Negative for back pain and neck pain.   Skin: Negative for color change, pallor, rash and wound.   Allergic/Immunologic: Negative for environmental allergies and food  allergies.   Neurological: Positive for dizziness, light-headedness and headaches.   Hematological: Does not bruise/bleed easily.   Psychiatric/Behavioral: Negative.        Past Medical History:   Diagnosis Date   • Asthma    • Celiac disease    • Cutaneous lupus erythematosus    • Disease of thyroid gland    • Migraine        Past Surgical History:   Procedure Laterality Date   • APPENDECTOMY     • HERNIA REPAIR     • TUBAL ABDOMINAL LIGATION         Family History   Problem Relation Age of Onset   • Cancer Maternal Grandmother    • Diabetes Maternal Grandmother    • Hypertension Maternal Grandmother        Social History     Socioeconomic History   • Marital status:      Spouse name: Not on file   • Number of children: Not on file   • Years of education: Not on file   • Highest education level: Not on file   Tobacco Use   • Smoking status: Former Smoker     Last attempt to quit: 2014     Years since quittin.1   • Smokeless tobacco: Never Used   Substance and Sexual Activity   • Alcohol use: No   • Drug use: No   • Sexual activity: Defer       Current Outpatient Medications:   •  Albuterol (VENTOLIN IN), Inhale., Disp: , Rfl:   •  cetirizine (zyrTEC) 10 MG tablet, Take 10 mg by mouth Daily., Disp: , Rfl:   •  Coenzyme Q10 (CO Q10) 200 MG capsule, Take 200 mg by mouth Daily., Disp: 30 capsule, Rfl: 11  •  Fluticasone Propionate, Inhal, (FLOVENT IN), Inhale., Disp: , Rfl:   •  levothyroxine sodium (TIROSINT) 100 MCG capsule, Take 100 mcg by mouth Daily., Disp: , Rfl:   •  montelukast (SINGULAIR) 10 MG tablet, Take 10 mg by mouth Every Night., Disp: , Rfl:   •  omeprazole (priLOSEC) 20 MG capsule, Take 20 mg by mouth As Needed., Disp: , Rfl:   •  Plecanatide 3 MG tablet, Take 3 mg by mouth Daily., Disp: 30 tablet, Rfl: 5  •  SUMAtriptan (IMITREX) 100 MG tablet, Take 1 tablet by mouth 1 (One) Time As Needed for Migraine., Disp: 9 tablet, Rfl: 5  •  SUMAtriptan (IMITREX) 20 MG/ACT nasal spray, 1 spray  "into each nostril Every 2 (Two) Hours As Needed for Migraine., Disp: 6 each, Rfl: 6    Allergies:   Patient has no known allergies.    Vitals:  /77 (BP Location: Left arm, Patient Position: Sitting, Cuff Size: Adult)   Pulse 82   Ht 162.6 cm (64\")   Wt 80.1 kg (176 lb 9.6 oz)   SpO2 99%   BMI 30.31 kg/m²     Physical Exam   Constitutional: She is oriented to person, place, and time. She appears well-developed and well-nourished. No distress.   HENT:   Head: Normocephalic and atraumatic.   Nose: Nose normal.   Mouth/Throat: Oropharynx is clear and moist.   Eyes: Conjunctivae are normal. Right eye exhibits no discharge. Left eye exhibits no discharge. No scleral icterus.   Neck: Normal range of motion. No JVD present.   Pulmonary/Chest: Effort normal.   Musculoskeletal: Normal range of motion. She exhibits no edema or deformity.   Neurological: She is alert and oriented to person, place, and time. Coordination normal.   Skin: No rash noted. She is not diaphoretic. No erythema.   Psychiatric: She has a normal mood and affect. Her behavior is normal. Judgment and thought content normal.   Vitals reviewed.      Assessment/Plan:  1. Chronic idiopathic constipation      Continue taking Trulance 3 mg once daily with or without food for treatment of constipation for now. If after a few days, it still does not seem to be helping then start Linzess 72 mcg once daily samples. She was also given samples of Linzess 145 mcg to increase to only if needed.      Trulance is a secretory stimulant (guanylate cyclase agonist) used to treat constipation by acting like the natural uroguanylin which helps to provide the appropriate amount of fluid in the intestines (works more in small intestine). Linzess is a secretory stimulant (guanylate cyclase agonist) used to treat constipation by binding to GC-C and acting locally on the luminal surface of the intestinal epithelium. Activation of GC-C results in an increase in " intra/extracellular concentrations of cGMP which stimulates secretion of chloride and bicarbonate into the intestinal lumen. This results in increased intestinal fluid and accelerated transit (works more in large intestine).      She was instructed to increase dietary fiber intake to 25-45g daily and a list of fiber foods was given with gluten free diet in mind. She has agreed to try to increase daily water intake and daily exercise as well. Start daily probiotic and Align samples were given.     At next visit, if symptoms have not improved, she will consider endoscopy for further evaluation.        Return if symptoms worsen or fail to improve.      Electronically signed 6/3/2019 4:49 PM  Alyse Aranda PA-C, [facility]

## 2019-07-10 ENCOUNTER — TELEPHONE (OUTPATIENT)
Dept: GASTROENTEROLOGY | Facility: CLINIC | Age: 39
End: 2019-07-10

## 2019-07-10 NOTE — TELEPHONE ENCOUNTER
"Left message for patient to call back. Alyse wanted me to call her to see if she wanted to make an appointment to be seen to discuss celiac disease. Patients PCP called and spoke with patient regarding her \"strict diet\".   "

## 2019-07-25 ENCOUNTER — OFFICE VISIT (OUTPATIENT)
Dept: GASTROENTEROLOGY | Facility: CLINIC | Age: 39
End: 2019-07-25

## 2019-07-25 VITALS
SYSTOLIC BLOOD PRESSURE: 110 MMHG | BODY MASS INDEX: 30.18 KG/M2 | DIASTOLIC BLOOD PRESSURE: 75 MMHG | WEIGHT: 175.8 LBS | HEART RATE: 94 BPM | OXYGEN SATURATION: 98 %

## 2019-07-25 DIAGNOSIS — Z87.19 HISTORY OF CELIAC DISEASE: ICD-10-CM

## 2019-07-25 DIAGNOSIS — R11.0 NAUSEA: Primary | ICD-10-CM

## 2019-07-25 DIAGNOSIS — R14.0 BLOATING: ICD-10-CM

## 2019-07-25 DIAGNOSIS — R10.10 UPPER ABDOMINAL PAIN: ICD-10-CM

## 2019-07-25 DIAGNOSIS — K59.04 CHRONIC IDIOPATHIC CONSTIPATION: ICD-10-CM

## 2019-07-25 PROCEDURE — 99214 OFFICE O/P EST MOD 30 MIN: CPT | Performed by: PHYSICIAN ASSISTANT

## 2019-07-25 RX ORDER — LUBIPROSTONE 24 UG/1
24 CAPSULE ORAL 2 TIMES DAILY WITH MEALS
Qty: 60 CAPSULE | Refills: 5 | Status: SHIPPED | OUTPATIENT
Start: 2019-07-25 | End: 2019-08-15 | Stop reason: SDDI

## 2019-07-25 RX ORDER — POLYETHYLENE GLYCOL 3350 17 G/17G
17 POWDER, FOR SOLUTION ORAL DAILY
Qty: 510 G | Refills: 2 | Status: SHIPPED | OUTPATIENT
Start: 2019-07-25 | End: 2019-11-11

## 2019-07-25 NOTE — PROGRESS NOTES
: 1980    Chief Complaint   Patient presents with   • Constipation       Estephanie Nowak is a 39 y.o. female who presents to the office today as a follow up appointment regarding constipation.    History of Present Illness:  Reports severe symptoms recently with nausea and upper abdominal pain. Pain is very severe, worse with certain positions. Has difficulty eating or sitting related to the pain. Nothing has seemed to help relieve it. Pain seemed to gradually worsen over the course of a few days. She stays very busy with work and has difficulty completing recommended therapy. Did not previously complete bowel cleanse as recommended. She has previous diagnosis of celiac disease based on serum testing. Has been gluten free for years. She previously had HIDA scan at [facility] which was normal 4 years ago. BMs are still not regular and still having constipation with hard stools and skip days between bowel movements.     Review of Systems   Constitutional: Negative for chills, fatigue and fever.   HENT: Negative for trouble swallowing.    Eyes: Negative.    Respiratory: Negative for cough, choking, chest tightness and shortness of breath.    Cardiovascular: Negative for chest pain.   Gastrointestinal: Positive for abdominal distention, abdominal pain, constipation and nausea. Negative for anal bleeding, blood in stool, diarrhea, rectal pain and vomiting.   Endocrine: Negative.    Genitourinary: Negative for difficulty urinating.   Musculoskeletal: Negative for back pain and neck pain.   Skin: Negative for color change, pallor, rash and wound.   Allergic/Immunologic: Negative for environmental allergies and food allergies.   Neurological: Positive for dizziness, light-headedness and headaches.   Hematological: Does not bruise/bleed easily.   Psychiatric/Behavioral: Negative.        Past Medical History:   Diagnosis Date   • Asthma    • Celiac disease    • Cutaneous lupus erythematosus    • Disease of  thyroid gland    • Migraine        Past Surgical History:   Procedure Laterality Date   • APPENDECTOMY     • HERNIA REPAIR     • TUBAL ABDOMINAL LIGATION         Family History   Problem Relation Age of Onset   • Cancer Maternal Grandmother    • Diabetes Maternal Grandmother    • Hypertension Maternal Grandmother        Social History     Socioeconomic History   • Marital status:      Spouse name: Not on file   • Number of children: Not on file   • Years of education: Not on file   • Highest education level: Not on file   Tobacco Use   • Smoking status: Former Smoker     Last attempt to quit: 2014     Years since quittin.2   • Smokeless tobacco: Never Used   Substance and Sexual Activity   • Alcohol use: No   • Drug use: No   • Sexual activity: Defer       Current Outpatient Medications:   •  Albuterol (VENTOLIN IN), Inhale., Disp: , Rfl:   •  cetirizine (zyrTEC) 10 MG tablet, Take 10 mg by mouth Daily., Disp: , Rfl:   •  Coenzyme Q10 (CO Q10) 200 MG capsule, Take 200 mg by mouth Daily., Disp: 30 capsule, Rfl: 11  •  Fluticasone Propionate, Inhal, (FLOVENT IN), Inhale., Disp: , Rfl:   •  levothyroxine sodium (TIROSINT) 100 MCG capsule, Take 100 mcg by mouth Daily., Disp: , Rfl:   •  montelukast (SINGULAIR) 10 MG tablet, Take 10 mg by mouth Every Night., Disp: , Rfl:   •  omeprazole (priLOSEC) 20 MG capsule, Take 20 mg by mouth As Needed., Disp: , Rfl:   •  Plecanatide 3 MG tablet, Take 3 mg by mouth Daily., Disp: 30 tablet, Rfl: 5  •  SUMAtriptan (IMITREX) 100 MG tablet, Take 1 tablet by mouth 1 (One) Time As Needed for Migraine., Disp: 9 tablet, Rfl: 5  •  SUMAtriptan (IMITREX) 20 MG/ACT nasal spray, 1 spray into each nostril Every 2 (Two) Hours As Needed for Migraine., Disp: 6 each, Rfl: 6    Allergies:   Patient has no known allergies.    Vitals:  /75 (BP Location: Left arm, Patient Position: Sitting, Cuff Size: Adult)   Pulse 94   Wt 79.7 kg (175 lb 12.8 oz)   SpO2 98%   BMI 30.18 kg/m²      Physical Exam   Constitutional: She is oriented to person, place, and time. She appears well-developed and well-nourished. No distress.   HENT:   Head: Normocephalic and atraumatic.   Nose: Nose normal.   Mouth/Throat: Oropharynx is clear and moist.   Eyes: Conjunctivae are normal. Right eye exhibits no discharge. Left eye exhibits no discharge. No scleral icterus.   Neck: Normal range of motion. No JVD present.   Cardiovascular: Normal rate, regular rhythm and normal heart sounds. Exam reveals no gallop and no friction rub.   No murmur heard.  Pulmonary/Chest: Effort normal and breath sounds normal. No respiratory distress. She has no wheezes. She has no rales. She exhibits no tenderness.   Abdominal: Soft. Bowel sounds are normal. She exhibits no mass. There is tenderness (generalized, worst in epigastric).   Musculoskeletal: Normal range of motion. She exhibits no edema or deformity.   Neurological: She is alert and oriented to person, place, and time. Coordination normal.   Skin: Skin is warm and dry. No rash noted. She is not diaphoretic. No erythema.   Psychiatric: She has a normal mood and affect. Her behavior is normal. Judgment and thought content normal.   Vitals reviewed.      Assessment/Plan:  1. Nausea    2. Chronic idiopathic constipation    3. History of celiac disease    4. Upper abdominal pain    5. Bloating      I have directed her to complete magnesium citrate bowel cleanse as soon as possible. The following day, she will start taking Amitiza 24 mcg for relief of chronic idiopathic constipation. She can continue to take Miralax as needed. This should be taken as directed; 1 tab PO twice daily with meals. Samples were given at today's visit. Amitiza is a secretory stimulant (chloride-channel activator) used to treat constipation by enhancing a chloride-rich intestinal fluid secretion via activating CIC-2. This increases motility in the intestine and thereby facilitates the passage of stool.  Increase dietary fiber with gluten free diet in mind. Increase water intake. Call with concerns.     New Medications Ordered This Visit   Medications   • magnesium citrate solution     Sig: Take 296 mL by mouth Take As Directed. Take 1 bottle now than 2nd bottle approx 6 hours after for clean out.     Dispense:  592 mL     Refill:  0   • lubiprostone (AMITIZA) 24 MCG capsule     Sig: Take 1 capsule by mouth 2 (Two) Times a Day With Meals.     Dispense:  60 capsule     Refill:  5   • polyethylene glycol (MIRALAX) powder     Sig: Take 17 g by mouth Daily.     Dispense:  510 g     Refill:  2           Return in about 3 weeks (around 8/15/2019) for recheck constipation.      Electronically signed 7/31/2019 4:52 PM  Alyse Aranda PA-C, [facility]

## 2019-07-30 ENCOUNTER — TELEPHONE (OUTPATIENT)
Dept: GASTROENTEROLOGY | Facility: CLINIC | Age: 39
End: 2019-07-30

## 2019-08-15 ENCOUNTER — OFFICE VISIT (OUTPATIENT)
Dept: GASTROENTEROLOGY | Facility: CLINIC | Age: 39
End: 2019-08-15

## 2019-08-15 VITALS
DIASTOLIC BLOOD PRESSURE: 64 MMHG | OXYGEN SATURATION: 98 % | SYSTOLIC BLOOD PRESSURE: 115 MMHG | HEART RATE: 78 BPM | BODY MASS INDEX: 30.9 KG/M2 | HEIGHT: 64 IN | WEIGHT: 181 LBS

## 2019-08-15 DIAGNOSIS — K59.04 CHRONIC IDIOPATHIC CONSTIPATION: Primary | ICD-10-CM

## 2019-08-15 PROCEDURE — 99213 OFFICE O/P EST LOW 20 MIN: CPT | Performed by: PHYSICIAN ASSISTANT

## 2019-08-15 RX ORDER — DOCUSATE SODIUM 100 MG/1
100 CAPSULE, LIQUID FILLED ORAL 3 TIMES WEEKLY
COMMUNITY
End: 2019-11-11

## 2019-08-15 NOTE — PROGRESS NOTES
: 1980    Chief Complaint   Patient presents with   • Constipation       Estephanie Nowak is a 39 y.o. female who presents to the office today as a follow up appointment regarding Constipation.    History of Present Illness:  She is no longer taking Amitiza 24 mcg twice daily as prescribed. She states that she did not want to take a medication which might cause her to have bowel movements or fecal urgency at her job. She is now taking only half dose of Miralax once daily. She is eating more fiber daily, with a goal of 30 g daily, drinking 5 bottles of water daily and taking an OTC stool softener 3 times weekly. She completed magnesium citrate bowel cleanse then later completed a Miralax bowel cleanse as directed by her PCP. She would like to have repeat duodenal biopsies and serum testing for celiac in the future. Her diet is still strictly gluten free. She remembers when she first received the diagnosis and avoided gluten, she immediately had symptom relief and felt better for a while.     Review of Systems   Constitutional: Negative for chills, fatigue and fever.   HENT: Negative for trouble swallowing.    Eyes: Negative.    Respiratory: Negative for cough, choking, chest tightness and shortness of breath.    Cardiovascular: Negative for chest pain.   Gastrointestinal: Positive for abdominal distention, abdominal pain and constipation. Negative for anal bleeding, blood in stool, diarrhea, nausea, rectal pain and vomiting.   Endocrine: Negative.    Genitourinary: Negative for difficulty urinating.   Musculoskeletal: Negative for back pain and neck pain.   Skin: Negative for color change, pallor, rash and wound.   Allergic/Immunologic: Negative for environmental allergies and food allergies.   Neurological: Positive for dizziness, light-headedness and headaches.   Hematological: Does not bruise/bleed easily.   Psychiatric/Behavioral: Negative.        Past Medical History:   Diagnosis Date   • Asthma    •  Celiac disease    • Cutaneous lupus erythematosus    • Disease of thyroid gland    • Migraine        Past Surgical History:   Procedure Laterality Date   • APPENDECTOMY     • HERNIA REPAIR     • TUBAL ABDOMINAL LIGATION         Family History   Problem Relation Age of Onset   • Cancer Maternal Grandmother    • Diabetes Maternal Grandmother    • Hypertension Maternal Grandmother        Social History     Socioeconomic History   • Marital status:      Spouse name: Not on file   • Number of children: Not on file   • Years of education: Not on file   • Highest education level: Not on file   Tobacco Use   • Smoking status: Former Smoker     Last attempt to quit: 2014     Years since quittin.3   • Smokeless tobacco: Never Used   Substance and Sexual Activity   • Alcohol use: No   • Drug use: No   • Sexual activity: Defer       Current Outpatient Medications:   •  Albuterol (VENTOLIN IN), Inhale., Disp: , Rfl:   •  cetirizine (zyrTEC) 10 MG tablet, Take 10 mg by mouth Daily., Disp: , Rfl:   •  Coenzyme Q10 (CO Q10) 200 MG capsule, Take 200 mg by mouth Daily., Disp: 30 capsule, Rfl: 11  •  Fluticasone Propionate, Inhal, (FLOVENT IN), Inhale., Disp: , Rfl:   •  levothyroxine sodium (TIROSINT) 100 MCG capsule, Take 100 mcg by mouth Daily., Disp: , Rfl:   •  montelukast (SINGULAIR) 10 MG tablet, Take 10 mg by mouth Every Night., Disp: , Rfl:   •  omeprazole (priLOSEC) 20 MG capsule, Take 20 mg by mouth As Needed., Disp: , Rfl:   •  polyethylene glycol (MIRALAX) powder, Take 17 g by mouth Daily., Disp: 510 g, Rfl: 2  •  SUMAtriptan (IMITREX) 100 MG tablet, Take 1 tablet by mouth 1 (One) Time As Needed for Migraine., Disp: 9 tablet, Rfl: 5  •  SUMAtriptan (IMITREX) 20 MG/ACT nasal spray, 1 spray into each nostril Every 2 (Two) Hours As Needed for Migraine., Disp: 6 each, Rfl: 6    Allergies:   Patient has no known allergies.    Vitals:  /64 (BP Location: Left arm, Patient Position: Sitting, Cuff Size:  "Adult)   Pulse 78   Ht 162.6 cm (64\")   Wt 82.1 kg (181 lb)   SpO2 98%   BMI 31.07 kg/m²     Physical Exam   Constitutional: She is oriented to person, place, and time. She appears well-developed and well-nourished. No distress.   HENT:   Head: Normocephalic and atraumatic.   Nose: Nose normal.   Mouth/Throat: Oropharynx is clear and moist.   Eyes: Conjunctivae are normal. Right eye exhibits no discharge. Left eye exhibits no discharge. No scleral icterus.   Neck: Normal range of motion. No JVD present.   Cardiovascular: Normal rate, regular rhythm and normal heart sounds. Exam reveals no gallop and no friction rub.   No murmur heard.  Pulmonary/Chest: Effort normal and breath sounds normal. No respiratory distress. She has no wheezes. She has no rales. She exhibits no tenderness.   Abdominal: Soft. Bowel sounds are normal. She exhibits no mass. There is no tenderness.   Musculoskeletal: Normal range of motion. She exhibits no edema or deformity.   Neurological: She is alert and oriented to person, place, and time. Coordination normal.   Skin: Skin is warm and dry. No rash noted. She is not diaphoretic. No erythema.   Psychiatric: She has a normal mood and affect. Her behavior is normal. Judgment and thought content normal.   Vitals reviewed.    Assessment:  1. Chronic idiopathic constipation      Plan:  Poor compliance with recommendations for treatment of constipation has definitely contributed to her overall lack of improvement over the past few months. She is now currently on a bowel regimen which she feels is best for her. She does not want to take other medications offered. She will continue with her current regimen. She will continue with increased dietary fiber and daily water intake. Call with concerns. Repeat EGD will be considered in the next few months, she also given offer for [facility] referral for sooner procedures but deferred.        Return if symptoms worsen or fail to " improve.      Electronically signed 8/23/2019 3:47 PM  Alyse Aranda PA-C, [facility]

## 2019-10-29 ENCOUNTER — TELEPHONE (OUTPATIENT)
Dept: GASTROENTEROLOGY | Facility: CLINIC | Age: 39
End: 2019-10-29

## 2019-11-11 ENCOUNTER — OFFICE VISIT (OUTPATIENT)
Dept: GASTROENTEROLOGY | Facility: CLINIC | Age: 39
End: 2019-11-11

## 2019-11-11 VITALS
HEART RATE: 88 BPM | DIASTOLIC BLOOD PRESSURE: 75 MMHG | BODY MASS INDEX: 32.61 KG/M2 | HEIGHT: 64 IN | OXYGEN SATURATION: 97 % | WEIGHT: 191 LBS | SYSTOLIC BLOOD PRESSURE: 134 MMHG

## 2019-11-11 DIAGNOSIS — R10.84 GENERALIZED ABDOMINAL PAIN: ICD-10-CM

## 2019-11-11 DIAGNOSIS — K59.04 CHRONIC IDIOPATHIC CONSTIPATION: ICD-10-CM

## 2019-11-11 DIAGNOSIS — R14.0 BLOATING: Primary | ICD-10-CM

## 2019-11-11 DIAGNOSIS — Z87.19 HISTORY OF CELIAC DISEASE: ICD-10-CM

## 2019-11-11 PROCEDURE — 99214 OFFICE O/P EST MOD 30 MIN: CPT | Performed by: PHYSICIAN ASSISTANT

## 2019-11-11 RX ORDER — SODIUM, POTASSIUM,MAG SULFATES 17.5-3.13G
SOLUTION, RECONSTITUTED, ORAL ORAL
Qty: 2 BOTTLE | Refills: 0 | Status: SHIPPED | OUTPATIENT
Start: 2019-11-11 | End: 2019-11-25

## 2019-11-11 NOTE — PATIENT INSTRUCTIONS
Fiber Foods  It is recommended that you consume 25-45 grams daily.    Fresh & Dried Fruit  Serving Size Fiber (g)    Apples with skin  1 medium 5.0    Apricot  3 medium 1.0    Apricots, dried  4 pieces 2.9    Banana  1 medium 3.9    Blueberries  1 cup 4.2    Cantaloupe, cubes  1 cup 1.3    Figs, dried  2 medium 3.7    Grapefruit  1/2 medium 3.1    Orange, navel  1 medium 3.4    Peach  1 medium 2.0    Peaches, dried  3 pieces 3.2    Pear  1 medium 5.1    Plum  1 medium 1.1    Raisins  1.5 oz box 1.6    Raspberries  1 cup 6.4    Strawberries  1 cup 4.4      Grains, Beans, Nuts & Seeds  Serving Size Fiber (g)    Almonds  1 oz 4.2    Black beans, cooked  1 cup 13.9    Bran cereal  1 cup 19.9    Bread, whole wheat  1 slice 2.0    Brown rice, dry  1 cup 7.9    Cashews  1 oz 1.0    Flax seeds  3 Tbsp. 6.9    Garbanzo beans, cooked  1 cup 5.8    Kidney beans, cooked  1 cup 11.6    Lentils, red cooked  1 cup 13.6    Lima beans, cooked  1 cup 8.6    Oats, rolled dry  1 cup 12.0    Quinoa (seeds) dry  1/4 cup 6.2    Quinoa, cooked  1 cup 8.4    Pasta, whole wheat  1 cup 6.3    Peanuts  1 oz 2.3    Pistachio nuts  1 oz 3.1    Pumpkin seeds  1/4 cup 4.1    Soybeans, cooked  1 cup 8.6    Sunflower seeds  1/4 cup 3.0    Walnuts  1 oz 3.1            Vegetables  Serving Size Fiber (g)    Avocado (fruit)  1 medium 11.8    Beets, cooked  1 cup 2.8    Beet greens  1 cup 4.2    Bok luis manuel, cooked  1 cup 2.8    Broccoli, cooked  1 cup 4.5    [place] sprouts, cooked  1 cup 3.6    Cabbage, cooked  1 cup 4.2    Carrot  1 medium 2.6    Carrot, cooked  1 cup 5.2    Cauliflower, cooked  1 cup 3.4    Jimenez slaw  1 cup 4.0    Bruno greens, cooked  1 cup 2.6    Corn, sweet  1 cup 4.6    Green beans  1 cup 4.0    Celery  1 stalk 1.1    Kale, cooked  1 cup 7.2    Onions, raw  1 cup 2.9    Peas, cooked  1 cup 8.8    Peppers, sweet  1 cup 2.6    Pop corn, air-popped  3 cups 3.6    Potato, baked w/ skin  1 medium 4.8    Spinach, cooked  1 cup 4.3     Summer squash, cooked  1 cup 2.5    Sweet potato, cooked  1 medium 4.9    Swiss chard, cooked  1 cup 3.7    Tomato  1 medium 1.0    Winter squash, cooked  1 cup 6.2    Zucchini, cooked  1 cup 2.6

## 2019-11-11 NOTE — PROGRESS NOTES
: 1980    Chief Complaint   Patient presents with   • Constipation       Estephanie Nowak is a 39 y.o. female who presents to the office today as a follow up appointment regarding constipation and bloating.    History of Present Illness:  Reports that she has become frustrated with her GI complaints. She has been struggling most of her life with irregular bowel movements and severe bloating accompanied with generalized abdominal discomforts. She remembers even as a child complaining of frequent abdominal pain. Approx 4 years ago, she was given a diagnosis of celiac disease based on serum testing. She stopped eating gluten and symptoms dramatically improved at that time. She had EGD later after being gluten free for several weeks which showed normal duodenal biopsy pathology. She and her PCP now question her diagnosis because she has had continued GI issues. She started eating gluten again approx 2 months ago and did not notice any changes in her bloating, generalized abdominal discomfort and constipation. She drinks at least 5 bottles of water daily. She considers her diet to be high in fiber. She is a runner but suffered bilateral knee injury over the past couple of months. Her bloating is so severe that she feels that she is 9 months pregnant (shows pictures of swollen abdomen). Regarding constipation, she has taken several medications in the past without relief including Miralax daily, Amitiza 24 mcg daily, Linzess 72 mcg daily, Trulance 3 mg daily and took magnesium citrate cleanse 2 times in the past without relief. She feels full constantly and feels as if her food does not digest and she has food sitting in her stomach for long periods.     She did have a colonoscopy at age 16 and was told her colon mucosa was normal but colon polyps were removed. Last EGD was by Dr. Perez 4-5 years ago. Her maternal great grandfather had colon cancer but there is no first degree family history of colon cancer.  Admits pertinent family history of digestive issues.     Review of Systems   Constitutional: Negative for chills, fatigue and fever.   HENT: Negative for trouble swallowing.    Eyes: Negative.    Respiratory: Negative for cough, choking, chest tightness and shortness of breath.    Cardiovascular: Negative for chest pain.   Gastrointestinal: Positive for abdominal distention, constipation and diarrhea. Negative for abdominal pain, anal bleeding, blood in stool, nausea, rectal pain and vomiting.   Endocrine: Negative.    Genitourinary: Negative for difficulty urinating.   Musculoskeletal: Negative for back pain and neck pain.   Skin: Negative for color change, pallor, rash and wound.   Allergic/Immunologic: Negative for environmental allergies and food allergies.   Neurological: Positive for dizziness, light-headedness and headaches.   Hematological: Does not bruise/bleed easily.   Psychiatric/Behavioral: Negative.        Past Medical History:   Diagnosis Date   • Asthma    • Celiac disease    • Cutaneous lupus erythematosus    • Disease of thyroid gland    • Migraine        Past Surgical History:   Procedure Laterality Date   • APPENDECTOMY     • COLONOSCOPY     • ENDOSCOPY     • HERNIA REPAIR     • TUBAL ABDOMINAL LIGATION         Family History   Problem Relation Age of Onset   • Cancer Maternal Grandmother    • Diabetes Maternal Grandmother    • Hypertension Maternal Grandmother        Social History     Socioeconomic History   • Marital status:      Spouse name: Not on file   • Number of children: Not on file   • Years of education: Not on file   • Highest education level: Not on file   Tobacco Use   • Smoking status: Former Smoker     Packs/day: 0.50     Years: 20.00     Pack years: 10.00     Types: Cigarettes     Last attempt to quit: 2014     Years since quittin.5   • Smokeless tobacco: Never Used   Substance and Sexual Activity   • Alcohol use: No   • Drug use: No   • Sexual activity: Defer  "      Current Outpatient Medications:   •  Albuterol (VENTOLIN IN), Inhale., Disp: , Rfl:   •  cetirizine (zyrTEC) 10 MG tablet, Take 10 mg by mouth Daily., Disp: , Rfl:   •  Coenzyme Q10 (CO Q10) 200 MG capsule, Take 200 mg by mouth Daily., Disp: 30 capsule, Rfl: 11  •  docusate sodium (COLACE) 100 MG capsule, Take 100 mg by mouth 3 (Three) Times a Week., Disp: , Rfl:   •  Fluticasone Propionate, Inhal, (FLOVENT IN), Inhale., Disp: , Rfl:   •  levothyroxine sodium (TIROSINT) 112 MCG capsule, Take 100 mcg by mouth Daily., Disp: , Rfl:   •  montelukast (SINGULAIR) 10 MG tablet, Take 10 mg by mouth Every Night., Disp: , Rfl:   •  omeprazole (priLOSEC) 20 MG capsule, Take 20 mg by mouth As Needed., Disp: , Rfl:   •  SUMAtriptan (IMITREX) 100 MG tablet, Take 1 tablet by mouth 1 (One) Time As Needed for Migraine., Disp: 9 tablet, Rfl: 5  •  SUMAtriptan (IMITREX) 20 MG/ACT nasal spray, 1 spray into each nostril Every 2 (Two) Hours As Needed for Migraine., Disp: 6 each, Rfl: 6  •  Miralax 17 g once daily    Allergies:   Patient has no known allergies.    Vitals:  /75 (BP Location: Left arm, Patient Position: Sitting, Cuff Size: Adult)   Pulse 88   Ht 162.6 cm (64\")   Wt 86.6 kg (191 lb)   SpO2 97%   BMI 32.79 kg/m²     Physical Exam   Constitutional: She is oriented to person, place, and time. She appears well-developed and well-nourished. No distress.   HENT:   Head: Normocephalic and atraumatic.   Nose: Nose normal.   Mouth/Throat: Oropharynx is clear and moist.   Eyes: Conjunctivae are normal. Right eye exhibits no discharge. Left eye exhibits no discharge. No scleral icterus.   Neck: Normal range of motion. No JVD present.   Cardiovascular: Normal rate, regular rhythm and normal heart sounds. Exam reveals no gallop and no friction rub.   No murmur heard.  Pulmonary/Chest: Effort normal and breath sounds normal. No respiratory distress. She has no wheezes. She has no rales. She exhibits no tenderness. "   Abdominal: Soft. Bowel sounds are normal. She exhibits no mass. There is tenderness (generalized, very mild).   Musculoskeletal: Normal range of motion. She exhibits no edema or deformity.   Neurological: She is alert and oriented to person, place, and time. Coordination normal.   Skin: Skin is warm and dry. No rash noted. She is not diaphoretic. No erythema.   Psychiatric: Her behavior is normal. Judgment and thought content normal.   Depressed affect   Vitals reviewed.    Assessment:  1. Bloating    2. Chronic idiopathic constipation    3. Generalized abdominal pain    4. History of celiac disease      Plan:  Orders Placed This Encounter   Procedures   • NM Gastric Emptying   • Follow Anesthesia Guidelines / Standing Orders   • Obtain Informed Consent     ESOPHAGOGASTRODUODENOSCOPY WITH BIOPSY CPT CODE: 22032 (N/A), COLONOSCOPY CPT CODE: 44303 (N/A)  She will need an esophagogastroduodenoscopy and colonoscopy performed with IV general sedation. All of the risks, benefits and alternatives of these procedures have been discussed with her, all of her questions have been answered and she has elected to proceed. She should follow up in the office after these procedures to discuss the results and further recommendations can be made at that time.    New Medications Ordered This Visit   Medications   • Prucalopride Succinate (MOTEGRITY) 2 MG tablet     Sig: Take 2 mg by mouth Daily.     Dispense:  30 tablet     Refill:  5   • sodium-potassium-magnesium sulfates (SUPREP) 17.5-3.13-1.6 GM/177ML solution oral solution     Sig: Follow written instructions for administration. One bottle at 8pm and another 6 hours prior to procedure as directed.     Dispense:  2 bottle     Refill:  0           Return for follow up after procedure to discuss results.      Electronically signed 11/15/2019 4:19 PM  Alyse Aranda PA-C, [facility]

## 2019-11-14 PROBLEM — R10.84 GENERALIZED ABDOMINAL PAIN: Status: ACTIVE | Noted: 2019-11-14

## 2019-11-14 PROBLEM — K59.04 CHRONIC IDIOPATHIC CONSTIPATION: Status: ACTIVE | Noted: 2019-11-14

## 2019-11-14 PROBLEM — Z87.19 HISTORY OF CELIAC DISEASE: Status: ACTIVE | Noted: 2019-11-14

## 2019-11-14 PROBLEM — R14.0 BLOATING: Status: ACTIVE | Noted: 2019-11-14

## 2019-11-18 ENCOUNTER — ANESTHESIA EVENT (OUTPATIENT)
Dept: PERIOP | Facility: HOSPITAL | Age: 39
End: 2019-11-18

## 2019-11-18 ENCOUNTER — HOSPITAL ENCOUNTER (OUTPATIENT)
Facility: HOSPITAL | Age: 39
Setting detail: HOSPITAL OUTPATIENT SURGERY
Discharge: HOME | End: 2019-11-18
Attending: INTERNAL MEDICINE | Admitting: INTERNAL MEDICINE

## 2019-11-18 ENCOUNTER — ANESTHESIA (OUTPATIENT)
Dept: PERIOP | Facility: HOSPITAL | Age: 39
End: 2019-11-18

## 2019-11-18 VITALS
SYSTOLIC BLOOD PRESSURE: 116 MMHG | DIASTOLIC BLOOD PRESSURE: 70 MMHG | WEIGHT: 191 LBS | RESPIRATION RATE: 20 BRPM | OXYGEN SATURATION: 100 % | TEMPERATURE: 97.5 F | HEART RATE: 82 BPM | HEIGHT: 64 IN | BODY MASS INDEX: 32.61 KG/M2

## 2019-11-18 DIAGNOSIS — Z87.19 HISTORY OF CELIAC DISEASE: ICD-10-CM

## 2019-11-18 DIAGNOSIS — R10.84 GENERALIZED ABDOMINAL PAIN: ICD-10-CM

## 2019-11-18 DIAGNOSIS — R14.0 BLOATING: ICD-10-CM

## 2019-11-18 DIAGNOSIS — K59.04 CHRONIC IDIOPATHIC CONSTIPATION: ICD-10-CM

## 2019-11-18 LAB
B-HCG UR QL: NEGATIVE
INTERNAL NEGATIVE CONTROL: NEGATIVE
INTERNAL POSITIVE CONTROL: POSITIVE
Lab: NORMAL

## 2019-11-18 PROCEDURE — 45385 COLONOSCOPY W/LESION REMOVAL: CPT | Performed by: INTERNAL MEDICINE

## 2019-11-18 PROCEDURE — 81025 URINE PREGNANCY TEST: CPT | Performed by: ANESTHESIOLOGY

## 2019-11-18 PROCEDURE — 43239 EGD BIOPSY SINGLE/MULTIPLE: CPT | Performed by: INTERNAL MEDICINE

## 2019-11-18 PROCEDURE — 25010000002 FENTANYL CITRATE (PF) 100 MCG/2ML SOLUTION: Performed by: NURSE ANESTHETIST, CERTIFIED REGISTERED

## 2019-11-18 PROCEDURE — 25010000002 ONDANSETRON PER 1 MG: Performed by: NURSE ANESTHETIST, CERTIFIED REGISTERED

## 2019-11-18 PROCEDURE — 25010000002 PROPOFOL 10 MG/ML EMULSION: Performed by: NURSE ANESTHETIST, CERTIFIED REGISTERED

## 2019-11-18 RX ORDER — LIDOCAINE HYDROCHLORIDE 20 MG/ML
INJECTION, SOLUTION INFILTRATION; PERINEURAL AS NEEDED
Status: DISCONTINUED | OUTPATIENT
Start: 2019-11-18 | End: 2019-11-18 | Stop reason: SURG

## 2019-11-18 RX ORDER — SODIUM CHLORIDE 0.9 % (FLUSH) 0.9 %
3-10 SYRINGE (ML) INJECTION AS NEEDED
Status: DISCONTINUED | OUTPATIENT
Start: 2019-11-18 | End: 2019-11-19 | Stop reason: HOSPADM

## 2019-11-18 RX ORDER — MIDAZOLAM HYDROCHLORIDE 1 MG/ML
INJECTION INTRAMUSCULAR; INTRAVENOUS AS NEEDED
Status: DISCONTINUED | OUTPATIENT
Start: 2019-11-18 | End: 2019-11-18

## 2019-11-18 RX ORDER — SODIUM CHLORIDE, SODIUM LACTATE, POTASSIUM CHLORIDE, CALCIUM CHLORIDE 600; 310; 30; 20 MG/100ML; MG/100ML; MG/100ML; MG/100ML
125 INJECTION, SOLUTION INTRAVENOUS CONTINUOUS
Status: DISCONTINUED | OUTPATIENT
Start: 2019-11-18 | End: 2019-11-19 | Stop reason: HOSPADM

## 2019-11-18 RX ORDER — ONDANSETRON 2 MG/ML
INJECTION INTRAMUSCULAR; INTRAVENOUS AS NEEDED
Status: DISCONTINUED | OUTPATIENT
Start: 2019-11-18 | End: 2019-11-18 | Stop reason: SURG

## 2019-11-18 RX ORDER — FENTANYL CITRATE 50 UG/ML
INJECTION, SOLUTION INTRAMUSCULAR; INTRAVENOUS AS NEEDED
Status: DISCONTINUED | OUTPATIENT
Start: 2019-11-18 | End: 2019-11-18 | Stop reason: SURG

## 2019-11-18 RX ORDER — SODIUM CHLORIDE 0.9 % (FLUSH) 0.9 %
3 SYRINGE (ML) INJECTION EVERY 12 HOURS SCHEDULED
Status: DISCONTINUED | OUTPATIENT
Start: 2019-11-18 | End: 2019-11-19 | Stop reason: HOSPADM

## 2019-11-18 RX ORDER — PROPOFOL 10 MG/ML
VIAL (ML) INTRAVENOUS AS NEEDED
Status: DISCONTINUED | OUTPATIENT
Start: 2019-11-18 | End: 2019-11-18 | Stop reason: SURG

## 2019-11-18 RX ADMIN — SODIUM CHLORIDE, POTASSIUM CHLORIDE, SODIUM LACTATE AND CALCIUM CHLORIDE: 600; 310; 30; 20 INJECTION, SOLUTION INTRAVENOUS at 14:00

## 2019-11-18 RX ADMIN — LIDOCAINE HYDROCHLORIDE 30 MG: 20 INJECTION, SOLUTION INFILTRATION; PERINEURAL at 14:00

## 2019-11-18 RX ADMIN — FENTANYL CITRATE 100 MCG: 50 INJECTION INTRAMUSCULAR; INTRAVENOUS at 12:33

## 2019-11-18 RX ADMIN — PROPOFOL 30 MG: 10 INJECTION, EMULSION INTRAVENOUS at 14:04

## 2019-11-18 RX ADMIN — PROPOFOL 150 MCG/KG/MIN: 10 INJECTION, EMULSION INTRAVENOUS at 14:04

## 2019-11-18 RX ADMIN — ONDANSETRON 4 MG: 2 INJECTION INTRAMUSCULAR; INTRAVENOUS at 12:33

## 2019-11-18 NOTE — OP NOTE
COLONOSCOPY PROCEDURE NOTE    Estephanie Nowak  11/18/2019    PRE-PROCEDURE DIAGNOSIS:   Bloating [R14.0]  Chronic idiopathic constipation [K59.04]  Generalized abdominal pain [R10.84]  History of celiac disease [Z87.19]    POST-PROCEDURE DIAGNOSIS:  1.-  Normal colon and terminal ileum except for diminutive 6 mm ascending colon polyp removed with cold snare.  Pathology pending    INDICATION:  As noted in the preprocedure diagnosis    PROCEDURE:  COLONOSCOPY with polypectomy    GASTROENTEROLOGIST:  Jeff Obregon MD    ANESTHESIA:  Propofol administered by anesthesia.  See anesthesia notes for ASA classification    STAFF  Circulator: Trisha Curry RN  Endo Technician: Estephanie Mendez    Findings:  As noted in the post procedure diagnosis    OPERATIVE PROCEDURE   After proper informed consent was obtained, the patient was taken the operating suite and placed in left lateral decubitus position.  An Olympus video colonoscope 180 series was inserted in the rectum and advanced to the terminal ileum under direct visualization.  Cecum and terminal ileum were identified by visualization of the appendiceal orifice and ileocecal valve.  The colonoscope was then slowly withdrawn from the cecum to the rectum and passed a second time from rectum to cecum.  The colonoscope was retroflexed in the cecum and rectum. Scope was then withdrawn. Patient tolerated the procedure well. There were no immediate complications. Cecal withdrawal time was 16 minutes.    ESTIMATED BLOOD LOSS  None    SPECIMENS  Ascending colon polyp             COMPLICATIONS  None    RECOMMENDATIONS:  Await pathology report  Repeat colonoscopy in 5 years if polyp removed proves adenomatous  Keep upcoming GI clinic appointment    Jeff Obregon MD  11/18/19 2:40 PM

## 2019-11-18 NOTE — OP NOTE
ESOPHAGOGASTRODUODENOSCOPY PROCEDURE REPORT    Estephanie Nowak  11/18/2019    PRE-PROCEDURE DIAGNOSIS:  Bloating [R14.0]  Chronic idiopathic constipation [K59.04]  Generalized abdominal pain [R10.84]  History of celiac disease [Z87.19]    POST-PROCEDURE DIAGNOSIS:  1.-  Normal upper endoscopy.  Esophageal, gastric and duodenal biopsies pending to rule out eosinophilia, H. pylori and celiac disease    INDICATION:  Dyspepsia and bloating with questional history of celiac disease    Procedure(s):  ESOPHAGOGASTRODUODENOSCOPY WITH BIOPSY    GASTROENTEROLOGIST:  Jeff Obregon MD    ANESTHESIA:  Propofol administered by anesthesia.  See anesthesia notes for ASA classification    STAFF:  Circulator: Trisha Curry RN  Endo Technician: Estephanie Mendez    FINDINGS:  As noted in the post procedure diagnosis    OPERATIVE PROCEDURE:  After proper informed consent was obtained, patient was transferred to the OR/endoscopy suite.  Patient was then placed in left lateral decubitus position. The Olympus 180 series video gastroscope was inserted under orally under direct visualization.  Esophagus, stomach, and duodenum were inspected.  The endoscope was passed to the third portion of the duodenum.  Scope was retroflexed for visualization of the cardia and incisuraThe endoscope was then withdrawn. Patient tolerated the procedure well. There were no immediate complications.    ESTIMATED BLOOD LOSS:  None    SPECIMENS:  Esophageal, gastric antral and duodenal biopsies pending               COMPLICATIONS;  None    RECOMMENDATIONS/ PLAN:  With pathology report  Proceed with colonoscopy  Keep upcoming GI clinic appointment    Jeff Obregon MD     11/18/19 2:39 PM

## 2019-11-18 NOTE — ANESTHESIA POSTPROCEDURE EVALUATION
Patient: Estephanie Nowak    Procedure Summary     Date:  11/18/19 Room / Location:   [facility]  /  [facility]    Anesthesia Start:  1400 Anesthesia Stop:  1434    Procedures:       ESOPHAGOGASTRODUODENOSCOPY WITH BIOPSY CPT CODE: 29856 (N/A Esophagus)      COLONOSCOPY CPT CODE: 84067 (N/A ) Diagnosis:       Bloating      Chronic idiopathic constipation      Generalized abdominal pain      History of celiac disease      (Bloating [R14.0])      (Chronic idiopathic constipation [K59.04])      (Generalized abdominal pain [R10.84])      (History of celiac disease [Z87.19])    Surgeon:  Jeff Obregon MD Provider:  Shahid Salazar MD    Anesthesia Type:  general ASA Status:  2          Anesthesia Type: general  Last vitals  BP   116/70 (11/18/19 1515)   Temp   97.5 °F (36.4 °C) (11/18/19 1515)   Pulse   82 (11/18/19 1515)   Resp   20 (11/18/19 1515)     SpO2   100 % (11/18/19 1515)     Post Anesthesia Care and Evaluation    Patient location during evaluation: PHASE II  Patient participation: complete - patient participated  Level of consciousness: awake and alert  Pain score: 1  Pain management: adequate  Airway patency: patent  Anesthetic complications: No anesthetic complications  PONV Status: controlled  Cardiovascular status: acceptable  Respiratory status: acceptable  Hydration status: acceptable

## 2019-11-18 NOTE — ANESTHESIA PREPROCEDURE EVALUATION
Anesthesia Evaluation     Patient summary reviewed and Nursing notes reviewed   no history of anesthetic complications:  NPO Solid Status: > 8 hours  NPO Liquid Status: > 8 hours           Airway   Mallampati: II  TM distance: >3 FB  Neck ROM: full  no difficulty expected  Dental - normal exam     Pulmonary - normal exam   (+) a smoker Former, asthma,  Cardiovascular - negative cardio ROS and normal exam  Exercise tolerance: good (4-7 METS)    NYHA Classification: II        Neuro/Psych  (+) headaches,     GI/Hepatic/Renal/Endo    (+)  GERD,  thyroid problem hypothyroidism    Musculoskeletal     (+) back pain,   Abdominal  - normal exam    Bowel sounds: normal.   Substance History - negative use     OB/GYN negative ob/gyn ROS         Other - negative ROS                       Anesthesia Plan    ASA 2     general     intravenous induction     Anesthetic plan, all risks, benefits, and alternatives have been provided, discussed and informed consent has been obtained with: patient and spouse/significant other.  Use of blood products discussed with patient and spouse/significant other  Consented to blood products.

## 2019-11-19 RX ORDER — ONDANSETRON 2 MG/ML
4 INJECTION INTRAMUSCULAR; INTRAVENOUS AS NEEDED
Status: DISCONTINUED | OUTPATIENT
Start: 2019-11-19 | End: 2019-11-19 | Stop reason: HOSPADM

## 2019-11-19 RX ORDER — FENTANYL CITRATE 50 UG/ML
50 INJECTION, SOLUTION INTRAMUSCULAR; INTRAVENOUS
Status: DISCONTINUED | OUTPATIENT
Start: 2019-11-19 | End: 2019-11-19 | Stop reason: HOSPADM

## 2019-11-19 RX ORDER — IPRATROPIUM BROMIDE AND ALBUTEROL SULFATE 2.5; .5 MG/3ML; MG/3ML
3 SOLUTION RESPIRATORY (INHALATION) ONCE AS NEEDED
Status: DISCONTINUED | OUTPATIENT
Start: 2019-11-19 | End: 2019-11-19 | Stop reason: HOSPADM

## 2019-11-19 RX ORDER — OXYCODONE HYDROCHLORIDE AND ACETAMINOPHEN 5; 325 MG/1; MG/1
1 TABLET ORAL ONCE AS NEEDED
Status: DISCONTINUED | OUTPATIENT
Start: 2019-11-19 | End: 2019-11-19 | Stop reason: HOSPADM

## 2019-11-20 LAB
LAB AP CASE REPORT: NORMAL
PATH REPORT.FINAL DX SPEC: NORMAL

## 2019-11-21 ENCOUNTER — HOSPITAL ENCOUNTER (OUTPATIENT)
Dept: NUCLEAR MEDICINE | Facility: HOSPITAL | Age: 39
Discharge: HOME | End: 2019-11-21

## 2019-11-21 DIAGNOSIS — R14.0 BLOATING: ICD-10-CM

## 2019-11-21 DIAGNOSIS — K59.04 CHRONIC IDIOPATHIC CONSTIPATION: ICD-10-CM

## 2019-11-21 PROCEDURE — 78264 GASTRIC EMPTYING IMG STUDY: CPT

## 2019-11-21 PROCEDURE — A9541 TC99M SULFUR COLLOID: HCPCS | Performed by: PHYSICIAN ASSISTANT

## 2019-11-21 PROCEDURE — 0 TECHNETIUM SULFUR COLLOID: Performed by: PHYSICIAN ASSISTANT

## 2019-11-21 PROCEDURE — 78264 GASTRIC EMPTYING IMG STUDY: CPT | Performed by: RADIOLOGY

## 2019-11-21 RX ADMIN — TECHNETIUM TC 99M SULFUR COLLOID 1 DOSE: KIT at 13:05

## 2019-11-25 ENCOUNTER — OFFICE VISIT (OUTPATIENT)
Dept: GASTROENTEROLOGY | Facility: CLINIC | Age: 39
End: 2019-11-25

## 2019-11-25 VITALS
DIASTOLIC BLOOD PRESSURE: 69 MMHG | OXYGEN SATURATION: 98 % | HEART RATE: 86 BPM | SYSTOLIC BLOOD PRESSURE: 117 MMHG | BODY MASS INDEX: 32.2 KG/M2 | WEIGHT: 188.6 LBS | HEIGHT: 64 IN

## 2019-11-25 DIAGNOSIS — K59.04 CHRONIC IDIOPATHIC CONSTIPATION: Primary | ICD-10-CM

## 2019-11-25 DIAGNOSIS — Z86.010 HISTORY OF ADENOMATOUS POLYP OF COLON: ICD-10-CM

## 2019-11-25 DIAGNOSIS — K90.0 CELIAC DISEASE: ICD-10-CM

## 2019-11-25 PROCEDURE — 99214 OFFICE O/P EST MOD 30 MIN: CPT | Performed by: PHYSICIAN ASSISTANT

## 2019-11-25 NOTE — PROGRESS NOTES
: 1980    Chief Complaint   Patient presents with   • EGD/Colonoscopy follow up       Estephanie Nowak is a 39 y.o. female who presents to the office today as a follow up appointment regarding recent procedures, constipation and bloating.     History of Present Illness:  She would like to discuss the results of her recent procedures. She has a history of celiac disease diagnosed from serum testing but later had normal duodenal biopsies. She had already started a gluten free diet at the time of her previous EGD 4-5 years ago. She had dramatic relief back then from GI symptoms. She has not yet started prescription given at last visit, Motegrity for treatment of constipation.     Previous History:  Reports that she has become frustrated with her GI complaints. She has been struggling most of her life with irregular bowel movements and severe bloating accompanied with generalized abdominal discomforts. She remembers even as a child complaining of frequent abdominal pain. She started eating gluten again approx 2 months ago and did not notice any changes in her bloating, generalized abdominal discomfort and constipation. She drinks at least 5 bottles of water daily. She considers her diet to be high in fiber. She is a runner but suffered bilateral knee injury over the past couple of months. Her bloating is so severe that she feels that she is 9 months pregnant (shows pictures of swollen abdomen). Regarding constipation, she has taken several medications in the past without relief including Miralax daily, Amitiza 24 mcg daily, Linzess 72 mcg daily, Trulance 3 mg daily and took magnesium citrate cleanse 2 times in the past without relief. She feels full constantly and feels as if her food does not digest and she has food sitting in her stomach for long periods.      She did have a colonoscopy at age 16 and was told her colon mucosa was normal but colon polyps were removed. Last EGD was by Dr. Perez 4-5 years ago.  Her maternal great grandfather had colon cancer but there is no first degree family history of colon cancer. Admits pertinent family history of digestive issues.      Review of Systems   Constitutional: Negative for chills, fatigue and fever.   HENT: Negative for trouble swallowing.    Eyes: Negative.    Respiratory: Negative for cough, choking, chest tightness and shortness of breath.    Cardiovascular: Negative for chest pain.   Gastrointestinal: Positive for abdominal distention, diarrhea and nausea. Negative for abdominal pain, anal bleeding, blood in stool, constipation, rectal pain and vomiting.   Endocrine: Negative.    Genitourinary: Negative for difficulty urinating.   Musculoskeletal: Negative for back pain and neck pain.   Skin: Negative for color change, pallor, rash and wound.   Allergic/Immunologic: Negative for environmental allergies and food allergies.   Neurological: Positive for dizziness, light-headedness and headaches.   Hematological: Does not bruise/bleed easily.   Psychiatric/Behavioral: Negative.        Past Medical History:   Diagnosis Date   • Asthma    • Celiac disease    • Cutaneous lupus erythematosus    • Disease of thyroid gland    • Migraine        Past Surgical History:   Procedure Laterality Date   • APPENDECTOMY     • COLONOSCOPY     • COLONOSCOPY N/A 11/18/2019    Procedure: COLONOSCOPY CPT CODE: 53807;  Surgeon: Jeff Obregon MD;  Location: [facility] OR;  Service: Gastroenterology   • ENDOSCOPY     • ENDOSCOPY N/A 11/18/2019    Procedure: ESOPHAGOGASTRODUODENOSCOPY WITH BIOPSY CPT CODE: 32660;  Surgeon: Jeff Obregon MD;  Location: [facility] OR;  Service: Gastroenterology   • HERNIA REPAIR     • TUBAL ABDOMINAL LIGATION         Family History   Problem Relation Age of Onset   • Cancer Maternal Grandmother    • Diabetes Maternal Grandmother    • Hypertension Maternal Grandmother        Social History     Socioeconomic History   • Marital status:   "    Spouse name: Not on file   • Number of children: Not on file   • Years of education: Not on file   • Highest education level: Not on file   Tobacco Use   • Smoking status: Former Smoker     Packs/day: 0.50     Years: 20.00     Pack years: 10.00     Types: Cigarettes     Last attempt to quit: 2014     Years since quittin.6   • Smokeless tobacco: Never Used   Substance and Sexual Activity   • Alcohol use: No   • Drug use: No   • Sexual activity: Defer     Current Outpatient Medications:   •  Albuterol (VENTOLIN IN), Inhale., Disp: , Rfl:   •  cetirizine (zyrTEC) 10 MG tablet, Take 10 mg by mouth Daily., Disp: , Rfl:   •  Coenzyme Q10 (CO Q10) 200 MG capsule, Take 200 mg by mouth Daily., Disp: 30 capsule, Rfl: 11  •  Fluticasone Propionate, Inhal, (FLOVENT IN), Inhale., Disp: , Rfl:   •  levothyroxine sodium (TIROSINT) 112 MCG capsule, Take 100 mcg by mouth Daily., Disp: , Rfl:   •  montelukast (SINGULAIR) 10 MG tablet, Take 10 mg by mouth Every Night., Disp: , Rfl:   •  omeprazole (priLOSEC) 20 MG capsule, Take 20 mg by mouth As Needed., Disp: , Rfl:   •  SUMAtriptan (IMITREX) 100 MG tablet, Take 1 tablet by mouth 1 (One) Time As Needed for Migraine., Disp: 9 tablet, Rfl: 5  •  SUMAtriptan (IMITREX) 20 MG/ACT nasal spray, 1 spray into each nostril Every 2 (Two) Hours As Needed for Migraine., Disp: 6 each, Rfl: 6    Allergies:   Patient has no known allergies.    Vitals:  /69 (BP Location: Left arm, Patient Position: Sitting, Cuff Size: Adult)   Pulse 86   Ht 162.6 cm (64\")   Wt 85.5 kg (188 lb 9.6 oz)   SpO2 98%   BMI 32.37 kg/m²     Physical Exam   Constitutional: She is oriented to person, place, and time. She appears well-developed and well-nourished. No distress.   HENT:   Head: Normocephalic and atraumatic.   Nose: Nose normal.   Mouth/Throat: Oropharynx is clear and moist.   Eyes: Conjunctivae are normal. Right eye exhibits no discharge. Left eye exhibits no discharge. No scleral icterus. "   Neck: Normal range of motion. No JVD present.   Musculoskeletal: Normal range of motion. She exhibits no edema or deformity.   Neurological: She is alert and oriented to person, place, and time. Coordination normal.   Skin: No rash noted. She is not diaphoretic. No erythema.   Psychiatric: She has a normal mood and affect. Her behavior is normal. Judgment and thought content normal.   Vitals reviewed.    Results Review:  EGD and colonoscopy were completed by Dr. Obregon on 11/18/2019.  EGD was normal.  Colonoscopy was normal except for one a sending colon polyp.  Pathology shows duodenal mucosa with focal villous blunting and focal intraepithelial lymphocytosis, normal antral biopsy, normal esophageal biopsy and ascending colon polyp was sessile serrated adenoma without cytologic atypia.    Assessment:  1. Chronic idiopathic constipation    2. Celiac disease    3. History of adenomatous polyp of colon      Plan:  For constipation, she will will start Motegrity 2 mg once daily as previously prescribed. Continue with increased water intake. Continue with gluten-free fiber increase.     Resume gluten free diet, discussed gradual elimination over the next couple of months due to the holidays. When she started eating gluten again a couple of months ago, she did not notice a change in complaints. The diet change is more about future increased risk such as malnutrition and increased risk of lymphoma. Pathology shows classic signs of celiac disease with some preserved architecture (likely because she was gluten free for years until the last couple of months.)    She will need a repeat colonoscopy in 5 years due to personal history of sessile serrated adenomatous colon polyp (less than 10 mm). We will place her on the recall list to be called to schedule an appointment closer to that date. She was instructed to call the office if no reminder call is made within the proper number of years. Also, the indications for a  repeat colonoscopy sooner than the recall date were discussed; rectal bleeding, melena, change in bowel habits or significant abdominal pain.            Return in about 2 months (around 1/25/2020) for recheck constipation.      Electronically signed 11/25/2019 4:27 PM  Alyse Aranda PA-C, [facility]

## 2020-10-07 ENCOUNTER — TRANSCRIBE ORDERS (OUTPATIENT)
Dept: ADMINISTRATIVE | Facility: HOSPITAL | Age: 40
End: 2020-10-07

## 2020-10-07 ENCOUNTER — LAB (OUTPATIENT)
Dept: LAB | Facility: HOSPITAL | Age: 40
End: 2020-10-07

## 2020-10-07 DIAGNOSIS — Z11.59 ENCOUNTER FOR SCREENING FOR OTHER VIRAL DISEASES: ICD-10-CM

## 2020-10-07 DIAGNOSIS — Z11.59 ENCOUNTER FOR SCREENING FOR OTHER VIRAL DISEASES: Primary | ICD-10-CM

## 2020-10-07 PROCEDURE — C9803 HOPD COVID-19 SPEC COLLECT: HCPCS

## 2020-10-07 PROCEDURE — U0004 COV-19 TEST NON-CDC HGH THRU: HCPCS

## 2020-10-08 LAB — SARS-COV-2 RNA RESP QL NAA+PROBE: NOT DETECTED

## 2021-03-18 ENCOUNTER — TRANSCRIBE ORDERS (OUTPATIENT)
Dept: ADMINISTRATIVE | Facility: HOSPITAL | Age: 41
End: 2021-03-18

## 2021-03-18 ENCOUNTER — BULK ORDERING (OUTPATIENT)
Dept: CASE MANAGEMENT | Facility: OTHER | Age: 41
End: 2021-03-18

## 2021-03-18 DIAGNOSIS — Z23 IMMUNIZATION DUE: ICD-10-CM

## 2021-03-18 DIAGNOSIS — M25.562 LEFT KNEE PAIN, UNSPECIFIED CHRONICITY: Primary | ICD-10-CM

## 2021-03-18 DIAGNOSIS — M25.561 RIGHT KNEE PAIN, UNSPECIFIED CHRONICITY: ICD-10-CM

## 2021-03-22 ENCOUNTER — IMMUNIZATION (OUTPATIENT)
Dept: VACCINE CLINIC | Facility: HOSPITAL | Age: 41
End: 2021-03-22

## 2021-03-22 PROCEDURE — 91300 HC SARSCOV02 VAC 30MCG/0.3ML IM: CPT | Performed by: INTERNAL MEDICINE

## 2021-03-22 PROCEDURE — 0001A: CPT | Performed by: INTERNAL MEDICINE

## 2021-04-12 ENCOUNTER — IMMUNIZATION (OUTPATIENT)
Dept: VACCINE CLINIC | Facility: HOSPITAL | Age: 41
End: 2021-04-12

## 2021-04-12 PROCEDURE — 0002A: CPT | Performed by: INTERNAL MEDICINE

## 2021-04-12 PROCEDURE — 91300 HC SARSCOV02 VAC 30MCG/0.3ML IM: CPT | Performed by: INTERNAL MEDICINE

## 2021-06-18 ENCOUNTER — HOSPITAL ENCOUNTER (OUTPATIENT)
Dept: MAMMOGRAPHY | Facility: HOSPITAL | Age: 41
Discharge: HOME | End: 2021-06-18
Admitting: PHYSICIAN ASSISTANT

## 2021-06-18 DIAGNOSIS — Z12.31 VISIT FOR SCREENING MAMMOGRAM: ICD-10-CM

## 2021-06-18 PROCEDURE — 77067 SCR MAMMO BI INCL CAD: CPT | Performed by: RADIOLOGY

## 2021-06-18 PROCEDURE — 77063 BREAST TOMOSYNTHESIS BI: CPT | Performed by: RADIOLOGY

## 2021-06-18 PROCEDURE — 77063 BREAST TOMOSYNTHESIS BI: CPT

## 2021-06-18 PROCEDURE — 77067 SCR MAMMO BI INCL CAD: CPT

## 2021-08-04 ENCOUNTER — HOSPITAL ENCOUNTER (OUTPATIENT)
Dept: MAMMOGRAPHY | Facility: HOSPITAL | Age: 41
Discharge: HOME | End: 2021-08-04
Admitting: RADIOLOGY

## 2021-08-04 DIAGNOSIS — R92.8 ABNORMAL MAMMOGRAM: ICD-10-CM

## 2021-08-04 PROCEDURE — 77065 DX MAMMO INCL CAD UNI: CPT

## 2021-08-04 PROCEDURE — 77065 DX MAMMO INCL CAD UNI: CPT | Performed by: RADIOLOGY

## 2021-08-04 PROCEDURE — G0279 TOMOSYNTHESIS, MAMMO: HCPCS

## 2021-08-04 PROCEDURE — 77061 BREAST TOMOSYNTHESIS UNI: CPT | Performed by: RADIOLOGY

## 2022-02-04 DIAGNOSIS — M25.561 RIGHT KNEE PAIN, UNSPECIFIED CHRONICITY: Primary | ICD-10-CM

## 2022-02-08 ENCOUNTER — OFFICE VISIT (OUTPATIENT)
Dept: ORTHOPEDIC SURGERY | Facility: CLINIC | Age: 42
End: 2022-02-08

## 2022-02-08 ENCOUNTER — HOSPITAL ENCOUNTER (OUTPATIENT)
Dept: GENERAL RADIOLOGY | Facility: HOSPITAL | Age: 42
Discharge: HOME | End: 2022-02-08
Admitting: FAMILY MEDICINE

## 2022-02-08 VITALS
HEART RATE: 94 BPM | WEIGHT: 194 LBS | SYSTOLIC BLOOD PRESSURE: 121 MMHG | BODY MASS INDEX: 33.12 KG/M2 | DIASTOLIC BLOOD PRESSURE: 80 MMHG | HEIGHT: 64 IN

## 2022-02-08 DIAGNOSIS — M22.2X2 PATELLOFEMORAL PAIN SYNDROME OF LEFT KNEE: ICD-10-CM

## 2022-02-08 DIAGNOSIS — M25.562 BILATERAL CHRONIC KNEE PAIN: ICD-10-CM

## 2022-02-08 DIAGNOSIS — G89.29 CHRONIC LOW BACK PAIN WITH SCIATICA, SCIATICA LATERALITY UNSPECIFIED, UNSPECIFIED BACK PAIN LATERALITY: Primary | ICD-10-CM

## 2022-02-08 DIAGNOSIS — M54.40 CHRONIC LOW BACK PAIN WITH SCIATICA, SCIATICA LATERALITY UNSPECIFIED, UNSPECIFIED BACK PAIN LATERALITY: Primary | ICD-10-CM

## 2022-02-08 DIAGNOSIS — M25.561 RIGHT KNEE PAIN, UNSPECIFIED CHRONICITY: ICD-10-CM

## 2022-02-08 DIAGNOSIS — M25.561 BILATERAL CHRONIC KNEE PAIN: ICD-10-CM

## 2022-02-08 DIAGNOSIS — S83.8X1A INJURY OF MENISCUS OF KNEE, RIGHT, INITIAL ENCOUNTER: ICD-10-CM

## 2022-02-08 DIAGNOSIS — G89.29 BILATERAL CHRONIC KNEE PAIN: ICD-10-CM

## 2022-02-08 DIAGNOSIS — M17.0 PRIMARY OSTEOARTHRITIS OF BOTH KNEES: ICD-10-CM

## 2022-02-08 PROCEDURE — 73562 X-RAY EXAM OF KNEE 3: CPT | Performed by: RADIOLOGY

## 2022-02-08 PROCEDURE — 73562 X-RAY EXAM OF KNEE 3: CPT

## 2022-02-08 PROCEDURE — 99204 OFFICE O/P NEW MOD 45 MIN: CPT | Performed by: FAMILY MEDICINE

## 2022-02-08 NOTE — PROGRESS NOTES
New Patient Visit      Patient: Estephanie Nowak  YOB: 1980  Date of Encounter: 02/08/2022  PCP: Beatrice Thornton PA  Referring Provider: Em Baca MD     Subjective   Estephanie Nowak is a 41 y.o. female who presents to the office today for evaluation of Pain, Edema, and Initial Evaluation of the Right Knee      Chief Complaint   Patient presents with   • Right Knee - Pain, Edema, Initial Evaluation       HPI    Patient presents today for evaluation for right knee pain which has been ongoing for the past 20 years. 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 Prior to starting the job at the [employer], she was experiencing stiffness, mild pain, and popping intermittently. It was tolerable at that point. However, patient started training for a half marathon in 2018 and completed that in 2019. During this time, patient still had pain and started exhibiting mild swelling. In 09/2019 while running another marathon, she fell 3 times. Notes injuring her hip and bilateral knees at one point. Since those falls, the right knee swelling and pain worsened. Reports worsening stiffness and feeling like the knee is locking, pain with walking up the stairs or coming down the stairs. Right knee pain gets slightly better when walking but still walks with a limp. Right knee pain is exacerbated after driving long distances, transitioning from a sitting to a standing position, and tripping on objects. Patient has noticed some grinding in the left knee when she is walking up the stairs. Physical therapy has helped her range of motion but continued to have worsening swelling and pain. She resulted in doing aqua therapy, however, was not able to start due to pandemic. Patient notes her weight is attributing to the knee discomfort. Notes her weight is out of control, currently weighing 194 pounds. Patient was previously weighing 211 pounds but has  lost some weight. Patient is afraid she is gaining weight again. It is getting more difficult to be active now. Denies numbness and tingling but notes a burning sensation occasionally.     Notes that the only time the right knee has been tender to touch is during physical therapy but otherwise has been non-tender. Denies a family history of gout, pseudogout, rheumatoid arthritis. Had MRI of both knees ordered back in March 2021 but she was not aware this was ordered. Patient has knee braces that she has used in the past. Notes cannot utilize the braces now due to the girth of her lower extremities. Has been using over the counter salonpas for pain relief.     Patient Active Problem List   Diagnosis   • Migraine without aura and without status migrainosus, not intractable   • Migraine with aura and without status migrainosus, not intractable   • Myofascial pain   • Chronic low back pain with sciatica   • Bloating   • Chronic idiopathic constipation   • Generalized abdominal pain   • Celiac disease       Past Medical History:   Diagnosis Date   • Arthritis of back    • Asthma    • Back problem    • Celiac disease    • Cutaneous lupus erythematosus    • Disease of thyroid gland    • Hypothyroidism    • Migraine        Past Surgical History:   Procedure Laterality Date   • APPENDECTOMY     • COLONOSCOPY     • COLONOSCOPY N/A 11/18/2019    Procedure: COLONOSCOPY CPT CODE: 84575;  Surgeon: Jeff Obregon MD;  Location: [facility] OR;  Service: Gastroenterology   • ENDOSCOPY     • ENDOSCOPY N/A 11/18/2019    Procedure: ESOPHAGOGASTRODUODENOSCOPY WITH BIOPSY CPT CODE: 98751;  Surgeon: Jeff Obregon MD;  Location: [facility] OR;  Service: Gastroenterology   • HERNIA REPAIR     • TUBAL ABDOMINAL LIGATION         Family History   Problem Relation Age of Onset   • Cancer Maternal Grandmother    • Diabetes Maternal Grandmother    • Hypertension Maternal Grandmother    • Asthma Maternal Grandmother    •  Breast cancer Other        Social History     Socioeconomic History   • Marital status:    Tobacco Use   • Smoking status: Former Smoker     Packs/day: 0.50     Years: 20.00     Pack years: 10.00     Types: Cigarettes     Quit date: 2014     Years since quittin.8   • Smokeless tobacco: Never Used   Vaping Use   • Vaping Use: Former   Substance and Sexual Activity   • Alcohol use: No   • Drug use: No   • Sexual activity: Defer       Current Outpatient Medications   Medication Sig Dispense Refill   • Albuterol (VENTOLIN IN) Inhale.     • cetirizine (zyrTEC) 10 MG tablet Take 10 mg by mouth As Needed.     • Coenzyme Q10 (CO Q10) 200 MG capsule Take 200 mg by mouth Daily. 30 capsule 11   • Fluticasone Propionate, Inhal, (FLOVENT IN) Inhale.     • levothyroxine sodium (TIROSINT) 112 MCG capsule Take 100 mcg by mouth Daily.     • montelukast (SINGULAIR) 10 MG tablet Take 10 mg by mouth As Needed.     • Diclofenac Sodium (VOLTAREN) 1 % gel gel Apply 4 g topically to the appropriate area as directed 4 (Four) Times a Day As Needed (chronic knee pain). 350 g 3   • omeprazole (priLOSEC) 20 MG capsule Take 20 mg by mouth As Needed.     • Prucalopride Succinate (MOTEGRITY) 2 MG tablet Take 2 mg by mouth Daily. 30 tablet 5   • SUMAtriptan (IMITREX) 100 MG tablet Take 1 tablet by mouth 1 (One) Time As Needed for Migraine. 9 tablet 5   • SUMAtriptan (IMITREX) 20 MG/ACT nasal spray 1 spray into each nostril Every 2 (Two) Hours As Needed for Migraine. 6 each 6     No current facility-administered medications for this visit.       No Known Allergies    Review of Systems   Constitutional: Positive for activity change. Negative for fever.   Respiratory: Negative for shortness of breath and wheezing.    Cardiovascular: Negative for chest pain.   Musculoskeletal: Positive for arthralgias (bilateral knee pain, right worse than left) and myalgias.   Skin: Negative for color change and wound.   Neurological: Negative for  "weakness and numbness.       Visit Vitals  /80   Pulse 94   Ht 162.6 cm (64\")   Wt 88 kg (194 lb)   BMI 33.30 kg/m²     41 y.o.female  Physical Exam  Vitals and nursing note reviewed.   Constitutional:       General: She is not in acute distress.     Appearance: Normal appearance.   Pulmonary:      Effort: Pulmonary effort is normal. No respiratory distress.   Musculoskeletal:      Right knee: Effusion (trace) and crepitus present. No bony tenderness. Decreased range of motion. No tenderness. Normal pulse (2+ pulses ).      Instability Tests: Anterior drawer test negative. Posterior drawer test negative. Anterior Lachman test negative. Medial Johnny test positive.      Left knee: Effusion (trace) and crepitus present. No bony tenderness. Normal range of motion. No tenderness. Normal pulse (2+ pulses ).      Instability Tests: Anterior drawer test negative. Posterior drawer test negative. Anterior Lachman test negative. Medial Johnny test negative.      Comments: Right knee:  Intact deep tendon reflexes   Mildly restricted flexion of right knee with crepitus  Pain on valgus stress on the lateral side  Full extension only  Good strength with discomfort on all resisted testing    Left knee:  Intact deep tendon reflexes   good strength with discomfort on all resisted testing   Skin:     General: Skin is warm and dry.      Findings: No erythema.   Neurological:      General: No focal deficit present.      Mental Status: She is alert.      Sensory: No sensory deficit.      Motor: No weakness.         Radiology Results:    XR Knee 3 View Right    Result Date: 2/9/2022  Negative right knee  This report was finalized on 2/9/2022 8:10 AM by Dr. Tarun Chopra II, MD.      XR Knee 3+ View With Sunrise Left    Result Date: 2/9/2022  Negative plain films left knee  This report was finalized on 2/9/2022 8:10 AM by Dr. Tarun Chopra II, MD.      X-ray right knee 2/8/2022: My preliminary interpretation: Right:  Moderate " to severe Medial joint space loss with varus deformity. Left: mild medial joint space loss and lateral facet patellar facet joint space loss    Assessment/Plan   Diagnoses and all orders for this visit:    1. Chronic low back pain with sciatica, sciatica laterality unspecified, unspecified back pain laterality (Primary)  -     Diclofenac Sodium (VOLTAREN) 1 % gel gel; Apply 4 g topically to the appropriate area as directed 4 (Four) Times a Day As Needed (chronic knee pain).  Dispense: 350 g; Refill: 3  -     MRI Knee Right Without Contrast; Future    2. Bilateral chronic knee pain  -     Diclofenac Sodium (VOLTAREN) 1 % gel gel; Apply 4 g topically to the appropriate area as directed 4 (Four) Times a Day As Needed (chronic knee pain).  Dispense: 350 g; Refill: 3  -     MRI Knee Right Without Contrast; Future  -     XR Knee 3+ View With Sunrise Left; Future    3. Primary osteoarthritis of both knees  -     Diclofenac Sodium (VOLTAREN) 1 % gel gel; Apply 4 g topically to the appropriate area as directed 4 (Four) Times a Day As Needed (chronic knee pain).  Dispense: 350 g; Refill: 3  -     MRI Knee Right Without Contrast; Future    4. Injury of meniscus of knee, right, initial encounter  -     Diclofenac Sodium (VOLTAREN) 1 % gel gel; Apply 4 g topically to the appropriate area as directed 4 (Four) Times a Day As Needed (chronic knee pain).  Dispense: 350 g; Refill: 3  -     MRI Knee Right Without Contrast; Future    5. Patellofemoral pain syndrome of left knee  -     Diclofenac Sodium (VOLTAREN) 1 % gel gel; Apply 4 g topically to the appropriate area as directed 4 (Four) Times a Day As Needed (chronic knee pain).  Dispense: 350 g; Refill: 3  -     XR Knee 3+ View With Sunrise Left; Future         MEDS ORDERED DURING VISIT:  New Medications Ordered This Visit   Medications   • Diclofenac Sodium (VOLTAREN) 1 % gel gel     Sig: Apply 4 g topically to the appropriate area as directed 4 (Four) Times a Day As Needed  (chronic knee pain).     Dispense:  350 g     Refill:  3     MEDICATION ISSUES:  Discussed medication options and treatment plan of prescribed medication as well as the risks, benefits, and side effects including potential falls, possible impaired driving and metabolic adversities among others. Patient is agreeable to call the office with any worsening of symptoms or onset of side effects. Patient is agreeable to call 911 or go to the nearest ER should he/she begin having SI/HI.     Discussion:  Patient given hinged knee brace for the right knee which gave her some comfort. We will try topical Voltaren gel on both knees and as patient cannot take oral NSAIDs due to celiac disease. Recommend an MRI of the patient's right knee as she has already failed conservative treatment including physical therapy and has significant medial joint space loss which is likely indicative of damage to the medial meniscus. Patient will follow up after getting this imaging and will in the meantime use the NSAID, brace and home exercises. Patient also has arthritis in the left knee but not as bad as the right. Patient getting crepitus from the patellofemoral compartment and I have included exercises to try to normalize this. I do recommend she seek out a patellar stabilization brace without hinges which may help with this as well. Follow up after imaging.             This document has been electronically signed by Rocío COLÓN Rep   February 9, 2022 13:02 EST    Part of this note may be an electronic transcription/translation of spoken language to printed text using the Dragon Dictation System.      Patient verbalized consent to the visit recording. I have personally performed the services described in this document as transcribed by the above individual, and it is both accurate and complete.    Transcribed from ambient dictation for Igor Obregon DO by Rocío COLÓN Rep.  02/09/22   13:06 EST

## 2022-02-22 ENCOUNTER — HOSPITAL ENCOUNTER (OUTPATIENT)
Dept: MRI IMAGING | Facility: HOSPITAL | Age: 42
Discharge: HOME | End: 2022-02-22
Admitting: FAMILY MEDICINE

## 2022-02-22 DIAGNOSIS — G89.29 CHRONIC LOW BACK PAIN WITH SCIATICA, SCIATICA LATERALITY UNSPECIFIED, UNSPECIFIED BACK PAIN LATERALITY: ICD-10-CM

## 2022-02-22 DIAGNOSIS — M25.561 BILATERAL CHRONIC KNEE PAIN: ICD-10-CM

## 2022-02-22 DIAGNOSIS — M17.0 PRIMARY OSTEOARTHRITIS OF BOTH KNEES: ICD-10-CM

## 2022-02-22 DIAGNOSIS — G89.29 BILATERAL CHRONIC KNEE PAIN: ICD-10-CM

## 2022-02-22 DIAGNOSIS — M25.562 BILATERAL CHRONIC KNEE PAIN: ICD-10-CM

## 2022-02-22 DIAGNOSIS — S83.8X1A INJURY OF MENISCUS OF KNEE, RIGHT, INITIAL ENCOUNTER: ICD-10-CM

## 2022-02-22 DIAGNOSIS — M54.40 CHRONIC LOW BACK PAIN WITH SCIATICA, SCIATICA LATERALITY UNSPECIFIED, UNSPECIFIED BACK PAIN LATERALITY: ICD-10-CM

## 2022-02-22 PROCEDURE — 73721 MRI JNT OF LWR EXTRE W/O DYE: CPT

## 2022-02-22 PROCEDURE — 73721 MRI JNT OF LWR EXTRE W/O DYE: CPT | Performed by: RADIOLOGY

## 2022-03-01 ENCOUNTER — OFFICE VISIT (OUTPATIENT)
Dept: ORTHOPEDIC SURGERY | Facility: CLINIC | Age: 42
End: 2022-03-01

## 2022-03-01 VITALS
HEIGHT: 64 IN | DIASTOLIC BLOOD PRESSURE: 79 MMHG | BODY MASS INDEX: 33.12 KG/M2 | SYSTOLIC BLOOD PRESSURE: 127 MMHG | WEIGHT: 194 LBS | HEART RATE: 81 BPM

## 2022-03-01 DIAGNOSIS — G89.29 BILATERAL CHRONIC KNEE PAIN: Primary | ICD-10-CM

## 2022-03-01 DIAGNOSIS — S83.241A OTHER TEAR OF MEDIAL MENISCUS OF RIGHT KNEE AS CURRENT INJURY, INITIAL ENCOUNTER: ICD-10-CM

## 2022-03-01 DIAGNOSIS — S83.8X1A INJURY OF MENISCUS OF KNEE, RIGHT, INITIAL ENCOUNTER: ICD-10-CM

## 2022-03-01 DIAGNOSIS — M22.2X2 PATELLOFEMORAL PAIN SYNDROME OF LEFT KNEE: ICD-10-CM

## 2022-03-01 DIAGNOSIS — M17.0 PRIMARY OSTEOARTHRITIS OF BOTH KNEES: ICD-10-CM

## 2022-03-01 DIAGNOSIS — M25.562 BILATERAL CHRONIC KNEE PAIN: Primary | ICD-10-CM

## 2022-03-01 DIAGNOSIS — M25.561 BILATERAL CHRONIC KNEE PAIN: Primary | ICD-10-CM

## 2022-03-01 PROCEDURE — 99215 OFFICE O/P EST HI 40 MIN: CPT | Performed by: FAMILY MEDICINE

## 2022-03-01 NOTE — PROGRESS NOTES
Follow Up Visit      Patient: Estephanie Nowak  YOB: 1980  Date of Encounter: 03/01/2022  PCP: Beatrice Thornton PA  Referring Provider: No ref. provider found     Subjective   Estephanie Nowak is a 42 y.o. female who presents to the office today for evaluation of Pain, Follow-up, and MRI Review of the Right Knee      Chief Complaint   Patient presents with   • Right Knee - Pain, Follow-up, MRI Review       HPI       At the previous visit the patient was started on topical diclofenac gel and given a home exercise regiment. The patient reports that the gel has relieved the pain somewhat. She is wearing a hinge brace for the right knee that was provided to her by this clinic. She acquired a pull on knee brace for the Left knee. The braces provide her with a feeling of stability, but has no effect on her pain, popping or overall mechanics. She does notice that the swelling changes when she wears the braces. She is performing exercise at home. She states that squats cause pain and wall sits are difficult. The patient worked retail for 20 years, and enjoyed running, which is how she injured her knees. She currently works a desk job. She currently does some walking on trails, but struggles due to the knee pain, and is unable to perform high impact activities. She reports that she often trips over her foot, because she struggles to lift her foot all the way up. The patient did a course of physical therapy last year. She did not get message or special work with the left knee, but she did perform any exercises she was taught with both knees.     The patient was told she has a cyst on the lateral tibia of the left knee. She suspects it may be a Bakers cyst.     Patient Active Problem List   Diagnosis   • Migraine without aura and without status migrainosus, not intractable   • Migraine with aura and without status migrainosus, not intractable   • Myofascial pain   • Chronic low back pain with sciatica   •  "Bloating   • Chronic idiopathic constipation   • Generalized abdominal pain   • Celiac disease       Past Medical History:   Diagnosis Date   • Arthritis of back    • Asthma    • Back problem    • Celiac disease    • Cutaneous lupus erythematosus    • Disease of thyroid gland    • Hypothyroidism    • Migraine        No Known Allergies    Review of Systems   Constitutional: Positive for activity change. Negative for fever.   Respiratory: Negative for shortness of breath and wheezing.    Cardiovascular: Negative for chest pain.   Musculoskeletal: Positive for arthralgias and myalgias.   Skin: Negative for color change and wound.   Neurological: Negative for weakness and numbness.       Visit Vitals  /79   Pulse 81   Ht 162.6 cm (64\")   Wt 88 kg (194 lb)   BMI 33.30 kg/m²     42 y.o.female  Physical Exam  Vitals and nursing note reviewed.   Constitutional:       General: She is not in acute distress.     Appearance: Normal appearance.   Pulmonary:      Effort: Pulmonary effort is normal. No respiratory distress.   Musculoskeletal:      Right knee: Effusion (trace) and crepitus present. No bony tenderness. Decreased range of motion. Tenderness present over the medial joint line and MCL. Normal pulse (2+ pulses ).      Instability Tests: Anterior drawer test negative. Posterior drawer test negative. Anterior Lachman test negative. Medial Johnny test positive.      Left knee: Crepitus present. No effusion or bony tenderness. Normal range of motion. Tenderness present. Normal pulse (2+ pulses ).      Instability Tests: Anterior drawer test negative. Posterior drawer test negative. Anterior Lachman test negative. Medial Johnny test negative.      Right ankle: Normal range of motion.      Left ankle: Normal range of motion.      Comments: Right knee:  Intact deep tendon reflexes   Mildly restricted flexion of right knee with crepitus  Pain on valgus stress on the lateral side  Full extension only  Good strength " with discomfort on all resisted testing     Left knee:  TTP over lateral patella and soft tissues  positive J sign with popping  Intact deep tendon reflexes   good strength with discomfort on all resisted testing  Negative varus valgus   Skin:     General: Skin is warm and dry.      Findings: No erythema.   Neurological:      General: No focal deficit present.      Mental Status: She is alert.      Sensory: No sensory deficit.      Motor: No weakness.         Radiology Results:    XR Knee 3 View Right    Result Date: 2/9/2022  Negative right knee  This report was finalized on 2/9/2022 8:10 AM by Dr. Tarun Chopra II, MD.      MRI Knee Right Without Contrast    Result Date: 2/23/2022  Small joint effusion with a tear in the medial meniscus that extends to the inferior articular surface.  This report was finalized on 2/23/2022 8:34 AM by Dr. Tarun Chopra II, MD.      XR Knee 3+ View With Sunrise Left    Result Date: 2/9/2022  Negative plain films left knee  This report was finalized on 2/9/2022 8:10 AM by Dr. Tarun Chopra II, MD.        Assessment/Plan   Diagnoses and all orders for this visit:    1. Bilateral chronic knee pain (Primary)  -     Ambulatory Referral to Orthopedic Surgery    2. Other tear of medial meniscus of right knee as current injury, initial encounter  -     Ambulatory Referral to Orthopedic Surgery    3. Primary osteoarthritis of both knees  -     Ambulatory Referral to Orthopedic Surgery    4. Injury of meniscus of knee, right, initial encounter  -     Ambulatory Referral to Orthopedic Surgery    5. Patellofemoral pain syndrome of left knee         MEDS ORDERED DURING VISIT:  No orders of the defined types were placed in this encounter.    MEDICATION ISSUES:  Discussed medication options and treatment plan of prescribed medication as well as the risks, benefits, and side effects including potential falls, possible impaired driving and metabolic adversities among others. Patient is agreeable  to call the office with any worsening of symptoms or onset of side effects. Patient is agreeable to call 911 or go to the nearest ER should he/she begin having SI/HI.     Discussion:  We reviewed her MRI and discussed her surgical options in detail. We also went over appropriate bracing. She could also tape the knee if she so chooses. The patient will follow-up with surgery with an orthopedic surgeon for discussion of repair of meniscus tear. She will continue home exercises and topical diclofenac in the meantime. We discussed that she may discontinue or alter home exercises that cause pain such as squats and wall sits. If patient desires further treatment, she will let me know.    Spent > 40 minutes with patient interviewing, examining, reviewing imaging and discussing treatment options and documentation afterwards.         This document has been electronically signed by Igor Obregon DO   March 1, 2022 11:19 EST    Part of this note may be an electronic transcription/translation of spoken language to printed text using the Dragon Dictation System.    Transcribed from ambient dictation for Igor Obregon DO by Beatrice Ventura.  03/01/22   20:14 EST    Patient verbalized consent to the visit recording.  I have personally performed the services described in this document as transcribed by the above individual, and it is both accurate and complete.  Igor Obregon DO  3/29/2022  12:16 EDT

## 2022-05-02 ENCOUNTER — OFFICE VISIT (OUTPATIENT)
Dept: ORTHOPEDIC SURGERY | Facility: CLINIC | Age: 42
End: 2022-05-02

## 2022-05-02 VITALS — BODY MASS INDEX: 33.12 KG/M2 | HEIGHT: 64 IN | WEIGHT: 194 LBS

## 2022-05-02 DIAGNOSIS — M17.11 PRIMARY OSTEOARTHRITIS OF RIGHT KNEE: Primary | ICD-10-CM

## 2022-05-02 DIAGNOSIS — S83.241A ACUTE MEDIAL MENISCUS TEAR OF RIGHT KNEE, INITIAL ENCOUNTER: ICD-10-CM

## 2022-05-02 PROCEDURE — 99213 OFFICE O/P EST LOW 20 MIN: CPT | Performed by: ORTHOPAEDIC SURGERY

## 2022-05-02 PROCEDURE — 20610 DRAIN/INJ JOINT/BURSA W/O US: CPT | Performed by: ORTHOPAEDIC SURGERY

## 2022-05-02 RX ORDER — METHYLPREDNISOLONE ACETATE 80 MG/ML
80 INJECTION, SUSPENSION INTRA-ARTICULAR; INTRALESIONAL; INTRAMUSCULAR; SOFT TISSUE
Status: COMPLETED | OUTPATIENT
Start: 2022-05-02 | End: 2022-05-02

## 2022-05-02 RX ORDER — LIDOCAINE HYDROCHLORIDE 20 MG/ML
5 INJECTION, SOLUTION INFILTRATION; PERINEURAL
Status: COMPLETED | OUTPATIENT
Start: 2022-05-02 | End: 2022-05-02

## 2022-05-02 RX ADMIN — METHYLPREDNISOLONE ACETATE 80 MG: 80 INJECTION, SUSPENSION INTRA-ARTICULAR; INTRALESIONAL; INTRAMUSCULAR; SOFT TISSUE at 22:17

## 2022-05-02 RX ADMIN — LIDOCAINE HYDROCHLORIDE 5 ML: 20 INJECTION, SOLUTION INFILTRATION; PERINEURAL at 22:17

## 2022-05-02 NOTE — PROGRESS NOTES
Established Patient Visit      Patient: Estephanie Nowak  YOB: 1980  Date of Encounter: 05/02/2022        Chief Complaint:   Chief Complaint   Patient presents with   • Right Knee - Pain, Edema, Follow-up           HPI:   Estephanie Nowak, 42 y.o. female, referred by Igor Obregon DO presents for evaluation of right knee pain and possible meniscus tear.  He has experienced pain right knee over the past 7 years was intermittent and relatively mild.  She fell while running in 2019.  Her pain worsened.  She attended physical therapy for 8 weeks in March 2021 with mild improvement.  Continues to struggle with pain especially with running or any exercise where there is impact.  She experiences pain moderate degree over the medial aspect of her right knee she has not experience giving way or locking.  She has now completed MRI revealing tear of the medial meniscus.  Her past medical history is remarkable for osteoarthritis in her grandmother.  Her medical history includes thyroid disease, cutaneous lupus iliac disease and migraines.        Active Problem List:  Patient Active Problem List   Diagnosis   • Migraine without aura and without status migrainosus, not intractable   • Migraine with aura and without status migrainosus, not intractable   • Myofascial pain   • Chronic low back pain with sciatica   • Bloating   • Chronic idiopathic constipation   • Generalized abdominal pain   • Celiac disease           Past Medical History:  Past Medical History:   Diagnosis Date   • Arthritis of back    • Asthma    • Back problem    • Celiac disease    • Cutaneous lupus erythematosus    • Disease of thyroid gland    • Hypothyroidism    • Migraine            Past Surgical History:  Past Surgical History:   Procedure Laterality Date   • APPENDECTOMY     • COLONOSCOPY     • COLONOSCOPY N/A 11/18/2019    Procedure: COLONOSCOPY CPT CODE: 26731;  Surgeon: Jeff Obregon MD;  Location: [facility] OR;   Service: Gastroenterology   • ENDOSCOPY     • ENDOSCOPY N/A 2019    Procedure: ESOPHAGOGASTRODUODENOSCOPY WITH BIOPSY CPT CODE: 67705;  Surgeon: Jeff Obregon MD;  Location: [facility];  Service: Gastroenterology   • HERNIA REPAIR     • TUBAL ABDOMINAL LIGATION             Family History:  Family History   Problem Relation Age of Onset   • Cancer Maternal Grandmother    • Diabetes Maternal Grandmother    • Hypertension Maternal Grandmother    • Asthma Maternal Grandmother    • Breast cancer Other          Social History:  Social History     Socioeconomic History   • Marital status:    Tobacco Use   • Smoking status: Former Smoker     Packs/day: 0.50     Years: 20.00     Pack years: 10.00     Types: Cigarettes     Quit date: 2014     Years since quittin.0   • Smokeless tobacco: Never Used   Vaping Use   • Vaping Use: Former   Substance and Sexual Activity   • Alcohol use: No   • Drug use: No   • Sexual activity: Defer     Body mass index is 33.3 kg/m².      Medications:  Current Outpatient Medications   Medication Sig Dispense Refill   • Albuterol (VENTOLIN IN) Inhale.     • cetirizine (zyrTEC) 10 MG tablet Take 10 mg by mouth As Needed.     • Coenzyme Q10 (CO Q10) 200 MG capsule Take 200 mg by mouth Daily. 30 capsule 11   • Diclofenac Sodium (VOLTAREN) 1 % gel gel Apply 4 g topically to the appropriate area as directed 4 (Four) Times a Day As Needed (chronic knee pain). 350 g 3   • Fluticasone Propionate, Inhal, (FLOVENT IN) Inhale.     • levothyroxine sodium (TIROSINT) 112 MCG capsule Take 100 mcg by mouth Daily.     • montelukast (SINGULAIR) 10 MG tablet Take 10 mg by mouth As Needed.     • omeprazole (priLOSEC) 20 MG capsule Take 20 mg by mouth As Needed.     • Prucalopride Succinate (MOTEGRITY) 2 MG tablet Take 2 mg by mouth Daily. 30 tablet 5   • SUMAtriptan (IMITREX) 100 MG tablet Take 1 tablet by mouth 1 (One) Time As Needed for Migraine. 9 tablet 5   • SUMAtriptan  "(IMITREX) 20 MG/ACT nasal spray 1 spray into each nostril Every 2 (Two) Hours As Needed for Migraine. 6 each 6     No current facility-administered medications for this visit.         Allergies:  No Known Allergies      Physical Exam:   Physical Exam  GENERAL: 42 y.o. female, alert and oriented X 3 in no acute distress.   Visit Vitals  Ht 162.6 cm (64\")   Wt 88 kg (194 lb)   BMI 33.30 kg/m²         Musculoskeletal:   Examination right knee reveals mild effusion moderate medial joint line tenderness with area of localized tenderness posterior medial joint line.  She demonstrates full motion with no instability varus valgus stressing Lachman or drawer patella with normal tracking no crepitance.  Johnny's test is negative neurovascular exam is grossly intact.        Radiology/Labs:     MRI right knee by report describes medial meniscus tear extending to the inferior articular surface my review confirms tear involving the posterior horn of the medial meniscus appears primarily vertical involving the posterior horn.    Radiographs right knee by my review shows mild narrowing of the medial compartment with mild squaring and subchondral sclerosis suggestive of very early degenerative arthritis.        Assessment & Plan:   42 y.o. female presents with 7-year history of right knee pain worsened with a fall while running 3 years ago.  Presentation is consistent with osteoarthritis small tear involving the posterior horn medial meniscus we discussed her options before considering arthroscopic partial medial meniscectomy she is treated with  intraarticular steroid injection Depo-Medrol 80 mg we will see her back in 3 weeks and evaluate her response.        ICD-10-CM ICD-9-CM   1. Primary osteoarthritis of right knee  M17.11 715.16   2. Acute medial meniscus tear of right knee, initial encounter  S83.241A 836.0         Large Joint Arthrocentesis: R knee  Date/Time: 5/2/2022 10:17 PM  Consent given by: patient  Timeout: " Immediately prior to procedure a time out was called to verify the correct patient, procedure, equipment, support staff and site/side marked as required   Supporting Documentation  Indications: pain   Procedure Details  Location: knee - R knee  Preparation: Patient was prepped and draped in the usual sterile fashion  Needle size: 25 G  Approach: anterolateral  Medications administered: 5 mL lidocaine 2%; 80 mg methylPREDNISolone acetate 80 MG/ML  Patient tolerance: patient tolerated the procedure well with no immediate complications            Cc:   Beatrice Thornton PA                This document has been electronically signed by Alex Richter MD   May 2, 2022 22:14 EDT

## 2022-05-23 ENCOUNTER — OFFICE VISIT (OUTPATIENT)
Dept: ORTHOPEDIC SURGERY | Facility: CLINIC | Age: 42
End: 2022-05-23

## 2022-05-23 VITALS — WEIGHT: 194 LBS | BODY MASS INDEX: 33.12 KG/M2 | HEIGHT: 64 IN

## 2022-05-23 DIAGNOSIS — S83.241A ACUTE MEDIAL MENISCUS TEAR OF RIGHT KNEE, INITIAL ENCOUNTER: Primary | ICD-10-CM

## 2022-05-23 DIAGNOSIS — M17.11 PRIMARY OSTEOARTHRITIS OF RIGHT KNEE: ICD-10-CM

## 2022-05-23 PROCEDURE — 99213 OFFICE O/P EST LOW 20 MIN: CPT | Performed by: ORTHOPAEDIC SURGERY

## 2022-06-22 ENCOUNTER — OFFICE VISIT (OUTPATIENT)
Dept: ORTHOPEDIC SURGERY | Facility: CLINIC | Age: 42
End: 2022-06-22

## 2022-06-22 VITALS — BODY MASS INDEX: 33.12 KG/M2 | HEIGHT: 64 IN | WEIGHT: 194 LBS

## 2022-06-22 DIAGNOSIS — M17.11 PRIMARY OSTEOARTHRITIS OF RIGHT KNEE: Primary | ICD-10-CM

## 2022-06-22 PROCEDURE — 20610 DRAIN/INJ JOINT/BURSA W/O US: CPT | Performed by: ORTHOPAEDIC SURGERY

## 2022-06-22 RX ADMIN — LIDOCAINE HYDROCHLORIDE 5 ML: 10 INJECTION, SOLUTION EPIDURAL; INFILTRATION; INTRACAUDAL; PERINEURAL at 14:46

## 2022-06-22 NOTE — PROGRESS NOTES
Follow-up Visit         Patient: Estephanie Nowak  YOB: 1980  Date of Encounter: 2022      Chief  Complaint:   Chief Complaint   Patient presents with   • Right Knee - Follow-up         HPI:  Estephanie Nowak, 42 y.o. female presents in follow-up right knee osteoarthritis.  She has been approved for viscous injection right knee.  Her symptoms are unchanged.  He has MRI identified small medial meniscus tear superimposed on early osteoarthritis.        Medical History:  Patient Active Problem List   Diagnosis   • Migraine without aura and without status migrainosus, not intractable   • Migraine with aura and without status migrainosus, not intractable   • Myofascial pain   • Chronic low back pain with sciatica   • Bloating   • Chronic idiopathic constipation   • Generalized abdominal pain   • Celiac disease     Past Medical History:   Diagnosis Date   • Arthritis of back    • Asthma    • Back problem    • Celiac disease    • Cutaneous lupus erythematosus    • Disease of thyroid gland    • Hypothyroidism    • Migraine          Social History:  Social History     Socioeconomic History   • Marital status:    Tobacco Use   • Smoking status: Former Smoker     Packs/day: 0.50     Years: 20.00     Pack years: 10.00     Types: Cigarettes     Quit date: 2014     Years since quittin.1   • Smokeless tobacco: Never Used   Vaping Use   • Vaping Use: Former   Substance and Sexual Activity   • Alcohol use: No   • Drug use: No   • Sexual activity: Defer         Current Medications:    Current Outpatient Medications:   •  Albuterol (VENTOLIN IN), Inhale., Disp: , Rfl:   •  cetirizine (zyrTEC) 10 MG tablet, Take 10 mg by mouth As Needed., Disp: , Rfl:   •  Coenzyme Q10 (CO Q10) 200 MG capsule, Take 200 mg by mouth Daily., Disp: 30 capsule, Rfl: 11  •  Diclofenac Sodium (VOLTAREN) 1 % gel gel, Apply 4 g topically to the appropriate area as directed 4 (Four) Times a Day As Needed (chronic knee  pain)., Disp: 350 g, Rfl: 3  •  Fluticasone Propionate, Inhal, (FLOVENT IN), Inhale., Disp: , Rfl:   •  levothyroxine sodium (TIROSINT) 112 MCG capsule, Take 100 mcg by mouth Daily., Disp: , Rfl:   •  montelukast (SINGULAIR) 10 MG tablet, Take 10 mg by mouth As Needed., Disp: , Rfl:   •  omeprazole (priLOSEC) 20 MG capsule, Take 20 mg by mouth As Needed., Disp: , Rfl:   •  Prucalopride Succinate (MOTEGRITY) 2 MG tablet, Take 2 mg by mouth Daily., Disp: 30 tablet, Rfl: 5  •  SUMAtriptan (IMITREX) 100 MG tablet, Take 1 tablet by mouth 1 (One) Time As Needed for Migraine., Disp: 9 tablet, Rfl: 5  •  SUMAtriptan (IMITREX) 20 MG/ACT nasal spray, 1 spray into each nostril Every 2 (Two) Hours As Needed for Migraine., Disp: 6 each, Rfl: 6      Allergies:  No Known Allergies      Family History:  Family History   Problem Relation Age of Onset   • Cancer Maternal Grandmother    • Diabetes Maternal Grandmother    • Hypertension Maternal Grandmother    • Asthma Maternal Grandmother    • Breast cancer Other          Surgical History:  Past Surgical History:   Procedure Laterality Date   • APPENDECTOMY     • COLONOSCOPY     • COLONOSCOPY N/A 11/18/2019    Procedure: COLONOSCOPY CPT CODE: 27738;  Surgeon: Jeff Obregon MD;  Location: [facility];  Service: Gastroenterology   • ENDOSCOPY     • ENDOSCOPY N/A 11/18/2019    Procedure: ESOPHAGOGASTRODUODENOSCOPY WITH BIOPSY CPT CODE: 89958;  Surgeon: Jeff Obregon MD;  Location: [facility];  Service: Gastroenterology   • HERNIA REPAIR     • TUBAL ABDOMINAL LIGATION           Radiology:   No radiology results for the last 30 days.      Radiographs      Examination:   Examination right knee with minimal effusion.  Motion is full no gross instability neurovascular grossly intact.        Assessment & Plan:   42 y.o. female presents in follow-up right knee osteoarthritis approved for viscosupplementation today she is provided Synvisc 1 intra-articular lidocaine  block right knee.  She will return as needed.         Diagnosis Plan   1. Primary osteoarthritis of right knee           Large Joint Arthrocentesis: R knee  Date/Time: 6/22/2022 2:46 PM  Consent given by: patient  Site marked: site marked  Timeout: Immediately prior to procedure a time out was called to verify the correct patient, procedure, equipment, support staff and site/side marked as required   Supporting Documentation  Indications: pain   Procedure Details  Location: knee - R knee  Preparation: Patient was prepped and draped in the usual sterile fashion  Needle size: 25 G  Approach: anterolateral  Medications administered: 5 mL lidocaine PF 1% 1 %; 48 mg hylan 48 MG/6ML  Patient tolerance: patient tolerated the procedure well with no immediate complications              Cc:  Beatrice Thornton PA              This document has been electronically signed by Alex Richter MD   June 22, 2022 14:46 EDT

## 2022-06-29 RX ORDER — LIDOCAINE HYDROCHLORIDE 10 MG/ML
5 INJECTION, SOLUTION EPIDURAL; INFILTRATION; INTRACAUDAL; PERINEURAL
Status: COMPLETED | OUTPATIENT
Start: 2022-06-22 | End: 2022-06-22

## 2022-12-28 NOTE — H&P (VIEW-ONLY)
4y7m girl no PMHx, UTD on immunizations presents to ED c/o fever x1 day. Associated sxms consistent with viral syndrome. Very well appearing. Father defers swab. Medicate for fever. : 1980    Chief Complaint   Patient presents with   • Constipation       Estephanie Nowak is a 39 y.o. female who presents to the office today as a follow up appointment regarding constipation and bloating.    History of Present Illness:  Reports that she has become frustrated with her GI complaints. She has been struggling most of her life with irregular bowel movements and severe bloating accompanied with generalized abdominal discomforts. She remembers even as a child complaining of frequent abdominal pain. Approx 4 years ago, she was given a diagnosis of celiac disease based on serum testing. She stopped eating gluten and symptoms dramatically improved at that time. She had EGD later after being gluten free for several weeks which showed normal duodenal biopsy pathology. She and her PCP now question her diagnosis because she has had continued GI issues. She started eating gluten again approx 2 months ago and did not notice any changes in her bloating, generalized abdominal discomfort and constipation. She drinks at least 5 bottles of water daily. She considers her diet to be high in fiber. She is a runner but suffered bilateral knee injury over the past couple of months. Her bloating is so severe that she feels that she is 9 months pregnant (shows pictures of swollen abdomen). Regarding constipation, she has taken several medications in the past without relief including Miralax daily, Amitiza 24 mcg daily, Linzess 72 mcg daily, Trulance 3 mg daily and took magnesium citrate cleanse 2 times in the past without relief. She feels full constantly and feels as if her food does not digest and she has food sitting in her stomach for long periods.     She did have a colonoscopy at age 16 and was told her colon mucosa was normal but colon polyps were removed. Last EGD was by Dr. Perez 4-5 years ago. Her maternal great grandfather had colon cancer but there is no first degree family history of colon cancer.  Admits pertinent family history of digestive issues.     Review of Systems   Constitutional: Negative for chills, fatigue and fever.   HENT: Negative for trouble swallowing.    Eyes: Negative.    Respiratory: Negative for cough, choking, chest tightness and shortness of breath.    Cardiovascular: Negative for chest pain.   Gastrointestinal: Positive for abdominal distention, constipation and diarrhea. Negative for abdominal pain, anal bleeding, blood in stool, nausea, rectal pain and vomiting.   Endocrine: Negative.    Genitourinary: Negative for difficulty urinating.   Musculoskeletal: Negative for back pain and neck pain.   Skin: Negative for color change, pallor, rash and wound.   Allergic/Immunologic: Negative for environmental allergies and food allergies.   Neurological: Positive for dizziness, light-headedness and headaches.   Hematological: Does not bruise/bleed easily.   Psychiatric/Behavioral: Negative.        Past Medical History:   Diagnosis Date   • Asthma    • Celiac disease    • Cutaneous lupus erythematosus    • Disease of thyroid gland    • Migraine        Past Surgical History:   Procedure Laterality Date   • APPENDECTOMY     • COLONOSCOPY     • ENDOSCOPY     • HERNIA REPAIR     • TUBAL ABDOMINAL LIGATION         Family History   Problem Relation Age of Onset   • Cancer Maternal Grandmother    • Diabetes Maternal Grandmother    • Hypertension Maternal Grandmother        Social History     Socioeconomic History   • Marital status:      Spouse name: Not on file   • Number of children: Not on file   • Years of education: Not on file   • Highest education level: Not on file   Tobacco Use   • Smoking status: Former Smoker     Packs/day: 0.50     Years: 20.00     Pack years: 10.00     Types: Cigarettes     Last attempt to quit: 2014     Years since quittin.5   • Smokeless tobacco: Never Used   Substance and Sexual Activity   • Alcohol use: No   • Drug use: No   • Sexual activity: Defer     "      Current Outpatient Medications:   •  Albuterol (VENTOLIN IN), Inhale., Disp: , Rfl:   •  cetirizine (zyrTEC) 10 MG tablet, Take 10 mg by mouth Daily., Disp: , Rfl:   •  Coenzyme Q10 (CO Q10) 200 MG capsule, Take 200 mg by mouth Daily., Disp: 30 capsule, Rfl: 11  •  docusate sodium (COLACE) 100 MG capsule, Take 100 mg by mouth 3 (Three) Times a Week., Disp: , Rfl:   •  Fluticasone Propionate, Inhal, (FLOVENT IN), Inhale., Disp: , Rfl:   •  levothyroxine sodium (TIROSINT) 112 MCG capsule, Take 100 mcg by mouth Daily., Disp: , Rfl:   •  montelukast (SINGULAIR) 10 MG tablet, Take 10 mg by mouth Every Night., Disp: , Rfl:   •  omeprazole (priLOSEC) 20 MG capsule, Take 20 mg by mouth As Needed., Disp: , Rfl:   •  SUMAtriptan (IMITREX) 100 MG tablet, Take 1 tablet by mouth 1 (One) Time As Needed for Migraine., Disp: 9 tablet, Rfl: 5  •  SUMAtriptan (IMITREX) 20 MG/ACT nasal spray, 1 spray into each nostril Every 2 (Two) Hours As Needed for Migraine., Disp: 6 each, Rfl: 6  •  Miralax 17 g once daily    Allergies:   Patient has no known allergies.    Vitals:  /75 (BP Location: Left arm, Patient Position: Sitting, Cuff Size: Adult)   Pulse 88   Ht 162.6 cm (64\")   Wt 86.6 kg (191 lb)   SpO2 97%   BMI 32.79 kg/m²     Physical Exam   Constitutional: She is oriented to person, place, and time. She appears well-developed and well-nourished. No distress.   HENT:   Head: Normocephalic and atraumatic.   Nose: Nose normal.   Mouth/Throat: Oropharynx is clear and moist.   Eyes: Conjunctivae are normal. Right eye exhibits no discharge. Left eye exhibits no discharge. No scleral icterus.   Neck: Normal range of motion. No JVD present.   Cardiovascular: Normal rate, regular rhythm and normal heart sounds. Exam reveals no gallop and no friction rub.   No murmur heard.  Pulmonary/Chest: Effort normal and breath sounds normal. No respiratory distress. She has no wheezes. She has no rales. She exhibits no tenderness. "   Abdominal: Soft. Bowel sounds are normal. She exhibits no mass. There is tenderness (generalized, very mild).   Musculoskeletal: Normal range of motion. She exhibits no edema or deformity.   Neurological: She is alert and oriented to person, place, and time. Coordination normal.   Skin: Skin is warm and dry. No rash noted. She is not diaphoretic. No erythema.   Psychiatric: Her behavior is normal. Judgment and thought content normal.   Depressed affect   Vitals reviewed.    Assessment:  1. Bloating    2. Chronic idiopathic constipation    3. Generalized abdominal pain    4. History of celiac disease      Plan:  Orders Placed This Encounter   Procedures   • NM Gastric Emptying   • Follow Anesthesia Guidelines / Standing Orders   • Obtain Informed Consent     ESOPHAGOGASTRODUODENOSCOPY WITH BIOPSY CPT CODE: 77212 (N/A), COLONOSCOPY CPT CODE: 02157 (N/A)  She will need an esophagogastroduodenoscopy and colonoscopy performed with IV general sedation. All of the risks, benefits and alternatives of these procedures have been discussed with her, all of her questions have been answered and she has elected to proceed. She should follow up in the office after these procedures to discuss the results and further recommendations can be made at that time.    New Medications Ordered This Visit   Medications   • Prucalopride Succinate (MOTEGRITY) 2 MG tablet     Sig: Take 2 mg by mouth Daily.     Dispense:  30 tablet     Refill:  5   • sodium-potassium-magnesium sulfates (SUPREP) 17.5-3.13-1.6 GM/177ML solution oral solution     Sig: Follow written instructions for administration. One bottle at 8pm and another 6 hours prior to procedure as directed.     Dispense:  2 bottle     Refill:  0           Return for follow up after procedure to discuss results.      Electronically signed 11/15/2019 4:19 PM  Alyse Aranda PA-C, [facility]

## 2023-12-12 ENCOUNTER — TELEPHONE (OUTPATIENT)
Dept: ORTHOPEDIC SURGERY | Facility: CLINIC | Age: 43
End: 2023-12-12
Payer: COMMERCIAL

## 2023-12-12 NOTE — TELEPHONE ENCOUNTER
Caller: Estephanie Nowak    Relationship to patient: Self    Best call back number: [phone]    Chief complaint: right knee     Type of visit: hylan injection     Requested date: ASAP      Additional notes:PATIENT IS HAVING INCREASED PAIN

## 2024-02-14 ENCOUNTER — OFFICE VISIT (OUTPATIENT)
Dept: ORTHOPEDIC SURGERY | Facility: CLINIC | Age: 44
End: 2024-02-14
Payer: COMMERCIAL

## 2024-02-14 ENCOUNTER — HOSPITAL ENCOUNTER (OUTPATIENT)
Dept: GENERAL RADIOLOGY | Facility: HOSPITAL | Age: 44
Discharge: HOME | End: 2024-02-14
Admitting: ORTHOPAEDIC SURGERY
Payer: COMMERCIAL

## 2024-02-14 VITALS — HEIGHT: 64 IN | BODY MASS INDEX: 31.24 KG/M2 | WEIGHT: 183 LBS

## 2024-02-14 DIAGNOSIS — M25.551 RIGHT HIP PAIN: ICD-10-CM

## 2024-02-14 DIAGNOSIS — M79.605 LEG PAIN, LEFT: ICD-10-CM

## 2024-02-14 DIAGNOSIS — M70.61 TROCHANTERIC BURSITIS OF RIGHT HIP: ICD-10-CM

## 2024-02-14 DIAGNOSIS — M25.551 RIGHT HIP PAIN: Primary | ICD-10-CM

## 2024-02-14 PROCEDURE — 73502 X-RAY EXAM HIP UNI 2-3 VIEWS: CPT | Performed by: RADIOLOGY

## 2024-02-14 PROCEDURE — 72100 X-RAY EXAM L-S SPINE 2/3 VWS: CPT | Performed by: RADIOLOGY

## 2024-02-14 PROCEDURE — 72100 X-RAY EXAM L-S SPINE 2/3 VWS: CPT

## 2024-02-14 PROCEDURE — 73502 X-RAY EXAM HIP UNI 2-3 VIEWS: CPT

## 2024-02-14 RX ORDER — LEVOTHYROXINE SODIUM 0.12 MG/1
1 TABLET ORAL DAILY
COMMUNITY
Start: 2024-01-08

## 2024-02-14 RX ADMIN — LIDOCAINE HYDROCHLORIDE 5 ML: 10 INJECTION, SOLUTION EPIDURAL; INFILTRATION; INTRACAUDAL; PERINEURAL at 17:42

## 2024-02-14 RX ADMIN — METHYLPREDNISOLONE ACETATE 40 MG: 40 INJECTION, SUSPENSION INTRA-ARTICULAR; INTRALESIONAL; INTRAMUSCULAR; SOFT TISSUE at 17:42

## 2024-02-17 RX ORDER — METHYLPREDNISOLONE ACETATE 40 MG/ML
40 INJECTION, SUSPENSION INTRA-ARTICULAR; INTRALESIONAL; INTRAMUSCULAR; SOFT TISSUE
Status: COMPLETED | OUTPATIENT
Start: 2024-02-14 | End: 2024-02-14

## 2024-02-17 RX ORDER — LIDOCAINE HYDROCHLORIDE 10 MG/ML
5 INJECTION, SOLUTION EPIDURAL; INFILTRATION; INTRACAUDAL; PERINEURAL
Status: COMPLETED | OUTPATIENT
Start: 2024-02-14 | End: 2024-02-14

## 2024-03-06 ENCOUNTER — OFFICE VISIT (OUTPATIENT)
Dept: ORTHOPEDIC SURGERY | Facility: CLINIC | Age: 44
End: 2024-03-06
Payer: COMMERCIAL

## 2024-03-06 VITALS — BODY MASS INDEX: 31.24 KG/M2 | HEIGHT: 64 IN | WEIGHT: 182.98 LBS

## 2024-03-06 DIAGNOSIS — M76.31 IT BAND SYNDROME, RIGHT: Primary | ICD-10-CM

## 2024-03-06 PROCEDURE — 99213 OFFICE O/P EST LOW 20 MIN: CPT | Performed by: ORTHOPAEDIC SURGERY

## 2024-03-06 NOTE — PROGRESS NOTES
Follow-up Visit         Patient: Estephanie Nowak  YOB: 1980  Date of Encounter: 03/06/2024      Chief  Complaint:   Chief Complaint   Patient presents with    Right Hip - Pain, Follow-up         HPI:  Estephanie Nowak, 44 y.o. female presents in follow-up right hip pain.  She was last seen February 14, 2024 and was provided steroid injection region of the greater trochanteric bursa.  She reports no improvement.  She has experienced the above symptoms about 4 months and continues to localize pain posterior lateral aspect of her hip.  Reports pain especially with driving and now with sitting.  She describes pain from her hip down the lateral aspect of her leg beyond her knee.  She does not experience weakness or numbness right leg.  Her past medical history includes cutaneous lupus.        Medical History:  Patient Active Problem List   Diagnosis    Migraine without aura and without status migrainosus, not intractable    Migraine with aura and without status migrainosus, not intractable    Myofascial pain    Chronic low back pain with sciatica    Bloating    Chronic idiopathic constipation    Generalized abdominal pain    Celiac disease    It band syndrome, right     Past Medical History:   Diagnosis Date    Arthritis of back     Asthma     Back problem     Celiac disease     Cutaneous lupus erythematosus     Disease of thyroid gland     Hashimoto's disease     Hypothyroidism     Knee swelling 2021    Migraine            Social History:  Social History     Socioeconomic History    Marital status:    Tobacco Use    Smoking status: Former     Current packs/day: 0.00     Average packs/day: 0.3 packs/day for 20.0 years (5.0 ttl pk-yrs)     Types: Cigarettes     Start date: 12/21/1993     Quit date: 12/21/2013     Years since quitting: 10.2    Smokeless tobacco: Never   Vaping Use    Vaping status: Former   Substance and Sexual Activity    Alcohol use: Not Currently    Drug use: No    Sexual  activity: Yes     Partners: Male     Birth control/protection: None     Comment:            Current Medications:    Current Outpatient Medications:     Albuterol (VENTOLIN IN), Inhale., Disp: , Rfl:     cetirizine (zyrTEC) 10 MG tablet, Take 1 tablet by mouth As Needed., Disp: , Rfl:     Coenzyme Q10 (CO Q10) 200 MG capsule, Take 200 mg by mouth Daily., Disp: 30 capsule, Rfl: 11    Diclofenac Sodium (VOLTAREN) 1 % gel gel, Apply 4 g topically to the appropriate area as directed 4 (Four) Times a Day As Needed (chronic knee pain)., Disp: 350 g, Rfl: 3    Fluticasone Propionate, Inhal, (FLOVENT IN), Inhale., Disp: , Rfl:     levothyroxine (SYNTHROID, LEVOTHROID) 125 MCG tablet, Take 1 tablet by mouth Daily., Disp: , Rfl:     montelukast (SINGULAIR) 10 MG tablet, Take 1 tablet by mouth As Needed., Disp: , Rfl:     omeprazole (priLOSEC) 20 MG capsule, Take 1 capsule by mouth As Needed., Disp: , Rfl:     Prucalopride Succinate (MOTEGRITY) 2 MG tablet, Take 2 mg by mouth Daily., Disp: 30 tablet, Rfl: 5    SUMAtriptan (IMITREX) 100 MG tablet, Take 1 tablet by mouth 1 (One) Time As Needed for Migraine., Disp: 9 tablet, Rfl: 5    SUMAtriptan (IMITREX) 20 MG/ACT nasal spray, 1 spray into each nostril Every 2 (Two) Hours As Needed for Migraine., Disp: 6 each, Rfl: 6        Allergies:  No Known Allergies        Family History:  Family History   Problem Relation Age of Onset    Cancer Maternal Grandmother     Diabetes Maternal Grandmother     Hypertension Maternal Grandmother     Asthma Maternal Grandmother     Breast cancer Other            Surgical History:  Past Surgical History:   Procedure Laterality Date    APPENDECTOMY      COLONOSCOPY      COLONOSCOPY N/A 11/18/2019    Procedure: COLONOSCOPY CPT CODE: 47083;  Surgeon: Jeff Obregon MD;  Location: [facility];  Service: Gastroenterology    ENDOSCOPY      ENDOSCOPY N/A 11/18/2019    Procedure: ESOPHAGOGASTRODUODENOSCOPY WITH BIOPSY CPT CODE: 40408;   Surgeon: Jeff Obregon MD;  Location: [facility];  Service: Gastroenterology    HERNIA REPAIR      TUBAL ABDOMINAL LIGATION             Radiology:   XR Spine Lumbar 2 or 3 View    Result Date: 2/14/2024  Unremarkable exam. No acute findings in the lumbar spine.   This report was finalized on 2/14/2024 8:55 AM by Dr. Leroy Avitia MD.      XR Hip With or Without Pelvis 2 - 3 View Right    Result Date: 2/14/2024    No acute findings in the right hip.   This report was finalized on 2/14/2024 8:36 AM by Dr. Leroy Avitia MD.         Orthopedic Examination:   Hip demonstrates minimal localized tenderness lateral aspect right hip in the region of the greater trochanteric bursa she has full internal and external rotation and full flexion without pain which is symmetrical with the left.  Her leg lengths are equal she is has mild to moderate tenderness lateral aspect of her right hip to knee along the IT band.  She has mild increased pain with leg abduction.  Knee exam is unremarkable.  Neurovascular exam grossly intact.          Assessment & Plan:   44 y.o. female presents with continued right hip pain laterally after steroid injection region of the greater trochanteric bursa.  She is referred to physical therapy for stretching and anti-inflammatory modalities. Her findings are somewhat consistent with IT band syndrome we will see how she responds she will follow-up in 6 to 8 weeks.           Diagnosis Plan   1. It band syndrome, right  Ambulatory Referral to Physical Therapy                Cc:  Beatrice Thornton PA              This document has been electronically signed by Alex Richter MD   March 7, 2024 07:30 EST

## 2024-05-15 ENCOUNTER — TRANSCRIBE ORDERS (OUTPATIENT)
Dept: ADMINISTRATIVE | Facility: HOSPITAL | Age: 44
End: 2024-05-15
Payer: COMMERCIAL

## 2024-05-15 DIAGNOSIS — Z12.31 VISIT FOR SCREENING MAMMOGRAM: Primary | ICD-10-CM

## (undated) DEVICE — Device

## (undated) DEVICE — TUBING, SUCTION, 3/16" X 10', STRAIGHT: Brand: MEDLINE

## (undated) DEVICE — GOWN,REINF,POLY,ECL,PP SLV,XL: Brand: MEDLINE

## (undated) DEVICE — CONN Y IRR DISP 1P/U

## (undated) DEVICE — TUBING, SUCTION, 1/4" X 20', STRAIGHT: Brand: MEDLINE INDUSTRIES, INC.

## (undated) DEVICE — THE SINGLE USE ETRAP – POLYP TRAP IS USED FOR SUCTION RETRIEVAL OF ENDOSCOPICALLY REMOVED POLYPS.: Brand: ETRAP

## (undated) DEVICE — THE BITE BLOCK MAXI, LATEX FREE STRAP IS USED TO PROTECT THE ENDOSCOPE INSERTION TUBE FROM BEING BITTEN BY THE PATIENT.

## (undated) DEVICE — Device: Brand: DEFENDO AIR/WATER/SUCTION AND BIOPSY VALVE

## (undated) DEVICE — FRCP BX RADJAW4 NDL 2.8 240CM LG OG BX40

## (undated) DEVICE — ENDOGATOR AUXILIARY WATER JET CONNECTOR: Brand: ENDOGATOR

## (undated) DEVICE — SINGLE PORT MANIFOLD: Brand: NEPTUNE 2

## (undated) DEVICE — SYR LUERLOK 30CC

## (undated) DEVICE — SUCTION CANISTER, 1500CC, RIGID: Brand: DEROYAL